# Patient Record
Sex: MALE | Race: OTHER | Employment: OTHER | URBAN - METROPOLITAN AREA
[De-identification: names, ages, dates, MRNs, and addresses within clinical notes are randomized per-mention and may not be internally consistent; named-entity substitution may affect disease eponyms.]

---

## 2017-11-27 PROBLEM — M46.26 OSTEOMYELITIS OF LUMBAR SPINE (HCC): Status: ACTIVE | Noted: 2017-11-27

## 2017-11-27 PROBLEM — M46.46 LUMBAR DISCITIS: Status: ACTIVE | Noted: 2017-11-27

## 2018-01-29 ENCOUNTER — OFFICE VISIT (OUTPATIENT)
Dept: FAMILY MEDICINE CLINIC | Age: 63
End: 2018-01-29

## 2018-01-29 VITALS
SYSTOLIC BLOOD PRESSURE: 157 MMHG | HEART RATE: 80 BPM | RESPIRATION RATE: 18 BRPM | OXYGEN SATURATION: 97 % | TEMPERATURE: 98.3 F | BODY MASS INDEX: 39.17 KG/M2 | DIASTOLIC BLOOD PRESSURE: 81 MMHG | WEIGHT: 315 LBS | HEIGHT: 75 IN

## 2018-01-29 DIAGNOSIS — I87.2 VENOUS INSUFFICIENCY OF BOTH LOWER EXTREMITIES: ICD-10-CM

## 2018-01-29 DIAGNOSIS — D68.9 ACQUIRED COAGULATION DISORDER (HCC): ICD-10-CM

## 2018-01-29 DIAGNOSIS — G89.4 CHRONIC PAIN SYNDROME: Primary | ICD-10-CM

## 2018-01-29 DIAGNOSIS — I48.0 PAROXYSMAL ATRIAL FIBRILLATION (HCC): ICD-10-CM

## 2018-01-29 DIAGNOSIS — E66.01 OBESITY, MORBID (HCC): ICD-10-CM

## 2018-01-29 RX ORDER — DIPHENHYDRAMINE HCL 25 MG
25 CAPSULE ORAL
COMMUNITY
End: 2018-12-12

## 2018-01-29 RX ORDER — FUROSEMIDE 20 MG/1
TABLET ORAL DAILY
COMMUNITY
End: 2018-12-12

## 2018-01-29 RX ORDER — OXYCODONE AND ACETAMINOPHEN 5; 325 MG/1; MG/1
1 TABLET ORAL
Qty: 84 TAB | Refills: 0 | Status: SHIPPED | OUTPATIENT
Start: 2018-01-29 | End: 2018-02-12

## 2018-01-29 RX ORDER — OXYCODONE AND ACETAMINOPHEN 5; 325 MG/1; MG/1
TABLET ORAL
Refills: 0 | COMMUNITY
Start: 2017-11-22 | End: 2018-01-29 | Stop reason: SDUPTHER

## 2018-01-29 RX ORDER — WARFARIN 10 MG/1
10 TABLET ORAL DAILY
COMMUNITY
End: 2018-01-31 | Stop reason: SDUPTHER

## 2018-01-29 RX ORDER — METHOCARBAMOL 500 MG/1
500 TABLET, FILM COATED ORAL 3 TIMES DAILY
Qty: 90 TAB | Refills: 2 | Status: SHIPPED | OUTPATIENT
Start: 2018-01-29 | End: 2018-12-12

## 2018-01-29 RX ORDER — WARFARIN 7.5 MG/1
7.5 TABLET ORAL DAILY
COMMUNITY
End: 2018-08-08 | Stop reason: DRUGHIGH

## 2018-01-29 RX ORDER — METHOCARBAMOL 500 MG/1
TABLET, FILM COATED ORAL 4 TIMES DAILY
COMMUNITY
End: 2018-01-29 | Stop reason: SDUPTHER

## 2018-01-29 RX ORDER — CARVEDILOL 3.12 MG/1
TABLET ORAL 2 TIMES DAILY WITH MEALS
COMMUNITY
End: 2018-12-12

## 2018-01-29 RX ORDER — OXYCODONE AND ACETAMINOPHEN 5; 325 MG/1; MG/1
1 TABLET ORAL
Qty: 84 TAB | Refills: 0 | Status: SHIPPED | OUTPATIENT
Start: 2018-02-12 | End: 2018-03-02 | Stop reason: SDUPTHER

## 2018-01-29 NOTE — PROGRESS NOTES
Chief Complaint   Patient presents with    New Patient       Pt preferred language for health care discussion is English. Is someone accompanying this pt? wife    Is the patient using any DME equipment during [address]? wheelchair    Depression Screening:  PHQ over the last two weeks 1/29/2018   Little interest or pleasure in doing things Not at all   Feeling down, depressed or hopeless Not at all   Total Score PHQ 2 0       Learning Assessment:  Learning Assessment 1/29/2018   PRIMARY LEARNER Patient   HIGHEST LEVEL OF EDUCATION - PRIMARY LEARNER  SOME COLLEGE   PRIMARY LANGUAGE ENGLISH   LEARNER PREFERENCE PRIMARY DEMONSTRATION   ANSWERED BY JULIA Rosenbaum   RELATIONSHIP SELF       Abuse Screening:  Abuse Screening Questionnaire 1/29/2018   Do you ever feel afraid of your partner? N   Are you in a relationship with someone who physically or mentally threatens you? N   Is it safe for you to go home? Y         Health Maintenance reviewed and discussed per provider. Yes, patients first visit any and all HM discussed with and ordered by the Provider    Pt currently taking Antiplatelet therapy? Coumadin      Coordination of Care:  1. Have you been to the ER, urgent care clinic since your last visit? Hospitalized since your last visit? [facility],[address] rehab    2. Have you seen or consulted any other health care providers outside of the [address] [place] [address] since your last visit? Include any pap smears or colon screening.  no

## 2018-01-29 NOTE — LETTER
Name:Casey Rosenbaum LMK:[birth year] MR #:<[id number]> Provider Magdalena Valenzuela NP  
*EDGS-037* BSMG-491 (5/16) Page 1 of 5 Initial [facility] CONTROLLED SUBSTANCE AGREEMENT I may be prescribed medications that are controlled substances as part  of my treatment plan for management of my medical condition(s). The goal of my treatment plan is to maintain and/or improve my health and wellbeing. Because controlled substances have an increased risk of abuse or harm, continual re-evaluation is needed determine if the goals of my treatment plan are being met for my safety and the safety of others. Darrius Davis  am entering into this Controlled Substance Agreement with my provider, Bibi Munoz NP at [address] [place] [address] . I understand that successful treatment requires mutual trust and honesty between me and my provider. I understand that there are state and federal laws and regulations which apply to the medications that my provider may prescribe that must be followed. I understand there are risks and benefits ts of taking the medicines that my provider may prescribe. I understand and agree that following this Agreement is necessary in continuing my provider-patient relationship and success of my treatment plan. As a part of my treatment plan, I agree to the following: COMMUNICATION: 
 
1. I will communicate fully with my provider about my medical condition(s), including the effect on my daily life and how well my medications are helping. I will tell my provider all of the medications that I take for any reason, including medications I receive from another health care provider, and will notify my provider about all issues, problems or concerns, including any side effects, which may be related to my medications. I understand that this information allows my provider to adjust my treatment plan to help manage my medical condition.  I understand that this information will become part of my permanent medical record. 2. I will notify my provider if I have a history of alcohol/drug misuse/addiction or if I have had treatment for alcohol/drug addiction in the past, or if I have a new problem with or concern about alcohol/drug use/addiction, because this increases the likelihood of high risk behaviors and may lead to serious medical conditions. 3. Females Only: I will notify my provider if I am or become pregnant, or if I intend to become pregnant, or if I intend to breastfeed. I understand that communication of these issues with my provider is important, due to possible effects my medication could have on an unborn fetus or breastfeeding child. Name:.Chandra Rosenbaum QKM:[birth year]77 MR #:<[id number]> Provider Ant Smith NP  
*[id number]* BSMG-491 (5/16) Page 2 of 5 Initial [employer] MISUSE OF MEDICATIONS / DRUGS: 
 
1. I agree to take all controlled substances as prescribed, and will not misuse or abuse any controlled substances prescribed by my provider. For my safety, I will not increase the amount of medicine I take without first talking with and getting permission from my provider. 2. If I have a medical emergency, another health care provider may prescribe me medication. If I seek emergency treatment, I will notify my provider within seventy-two (72) hours. 3. I understand that my provider may discuss my use and/or possible misuse/abuse of controlled substances and alcohol, as appropriate, with any health care provider involved in my care, pharmacist or legal authority. ILLEGAL DRUGS: 
 
1. I will not use illegal drugs of any kind, including but not limited to marijuana, heroin, cocaine, or any prescription drug which is not prescribed to me. DRUG DIVERSION / PRESCRIPTION FRAUD: 
 
1. I will not share, sell, trade, give away, or otherwise misuse my prescriptions or medications. 2. I will not alter any prescriptions provided to me by my provider. SINGLE PROVIDER: 
 
1. I agree that all controlled substances that I take will be prescribed only by my provider (or his/her covering provider) under this Agreement. This agreement does not prevent me from seeking emergency medical treatment or receiving pain management related to a surgery. PROTECTING MEDICATIONS: 
 
1. I am responsible for keeping my prescriptions and medications in a safe and secure place including safeguarding them from loss or theft. I understand that lost, stolen or damaged/destroyed prescriptions or medications will not be replaced. Name:.Chandra Rosenbaum VVD:0/56/4754 MR #:<[id number]> Provider Wallace Rodriguez, JULIA  
*[id number]* BSMG-491 (5/16) Page 3 of 5 Initial [facility] PRESCRIPTION RENEWALS/REFILLS: 
 
1. I will follow my controlled substance medication schedule as prescribed by my provider. 2. I understand and agree that I will make any requests for renewals or refills of my prescriptions only at the time of an office visit or during my providers regular office hours subject to the prescription refill requirements of the individual practice. 3. I understand that my provider may not call in prescriptions for controlled substances to my pharmacy. 4. I understand that my provider may adjust or discontinue these medications as deemed appropriate for my medical treatment plan. This Agreement does not guarantee the prescription of controlled medications. 5. I agree that if my medications are adjusted or discontinued, I will properly dispose of any remaining medications. I understand that I will be required to dispose of any remaining controlled medications prior to being provided with any prescriptions for other controlled medications.  
 
 
1. I authorize my provider and my pharmacy to cooperate fully with any local, state, or federal law enforcement agency in the investigation of any possible misuse, sale, or other diversion of my controlled substance prescriptions or medications. RISKS: 
 
 
1. I understand that if I do not adhere to this Agreement in any way, my provider may change my prescriptions, stop prescribing controlled substances or end our provider-patient relationship. 2. If my provider decides to stop prescribing medication, or decides to end our provider-patient relationship,my provider may require that I taper my medications slowly. If necessary, my provider may also provide a prescription for other medications to treat my withdrawal symptoms. UNDERSTANDING THIS AGREEMENT: 
 
I understand that my provider may adjust or stop my prescriptions for controlled substances based on my medical condition and my treatment plan. I understand that this Agreement does not guarantee that I will be prescribed medications or controlled substances. I understand that controlled substances may be just one part 
of my treatment plan. My initial on each page and my signature below shows that I have read each page of this Agreement, I have had an opportunity to ask questions, and all of my questions have been answered to my satisfaction by my provider. By signing below, I agree to comply with this Agreement, and I understand that if I do not follow the Agreements listed above, my provider may stop 
 
 
_________________________________________  Date/Time 1/29/2018 10:43 AM   
             (Patient Signature) 
 
 
________________________________________  Date/Time 1/29/2018 10:43 
            (Provider Signature)

## 2018-01-29 NOTE — MR AVS SNAPSHOT
[address] [place] [address] 
[phone] Patient: Asif Gallagher MRN: [id number] YTN:2/27/[birth year]351 Visit Information Date & Time Provider Department Dept. Phone Encounter #  
 1/29/2018  9:30 AM Sharlot Krabbe, NP [facility] [employer] [phone] [id number] Follow-up Instructions Return in about 4 weeks (around 2/26/2018). Upcoming Health Maintenance Date Due Hepatitis C Screening 1955 DTaP/Tdap/Td series (1 - Tdap) 2/28/1977 FOBT Q 1 YEAR AGE 50-75 2/28/2006 ZOSTER VACCINE AGE 60> 12/29/2015 Influenza Age 5 to Adult 8/1/2017 Allergies as of 1/29/2018  Never Reviewed Severity Noted Reaction Type Reactions Ciprofloxacin High 01/29/2018    Anaphylaxis Morphine  01/29/2018    Itching Does not alleviate pain Onion  01/29/2018    Other (comments) Elevated HTN and Body temp and cramping Current Immunizations  Never Reviewed No immunizations on file. Not reviewed this visit You Were Diagnosed With   
  
 Codes Comments Chronic pain syndrome    -  Primary ICD-10-CM: G89.4 ICD-9-CM: 338.4 Obesity, morbid ([facility]     ICD-10-CM: E66.01 
ICD-9-CM: 278.01 Paroxysmal atrial fibrillation (HCC)     ICD-10-CM: I48.0 ICD-9-CM: 427.31 Venous insufficiency of both lower extremities     ICD-10-CM: I87.2 ICD-9-CM: 459.81 Acquired coagulation disorder ([facility]     ICD-10-CM: T80.2 ICD-9-CM: 286. 9 Vitals BP Pulse Temp Resp Height(growth percentile) Weight(growth percentile) 157/81 80 98.3 °F (36.8 °C) (Oral) 18 6' 3\" (1.905 m) (!) 359 lb 9.6 oz (163.1 kg) SpO2 BMI Smoking Status 97% 44.95 kg/m2 Current Every Day Smoker BMI and BSA Data Body Mass Index Body Surface Area 44.95 kg/m 2 2.94 m 2 Preferred Pharmacy Pharmacy Name Phone [facility], [place] [address] [phone] Your Updated Medication List  
  
   
This list is accurate as of: 1/29/18 10:43 AM.  Always use your most recent med list.  
  
  
  
  
 BENADRYL 25 mg capsule Generic drug:  diphenhydrAMINE Take 25 mg by mouth every six (6) hours as needed. carvedilol 3.125 mg tablet Commonly known as:  Lakeisha Hard Take  by mouth two (2) times daily (with meals). furosemide 20 mg tablet Commonly known as:  LASIX Take  by mouth daily. methocarbamol 500 mg tablet Commonly known as:  ROBAXIN Take 1 Tab by mouth three (3) times daily. * oxyCODONE-acetaminophen 5-325 mg per tablet Commonly known as:  PERCOCET Take 1 Tab by mouth every four (4) hours as needed for Pain for up to 14 days. Max Daily Amount: 6 Tabs. * oxyCODONE-acetaminophen 5-325 mg per tablet Commonly known as:  PERCOCET Take 1 Tab by mouth every four (4) hours as needed for Pain. Max Daily Amount: 6 Tabs. Start taking on:  2/12/2018 * warfarin 10 mg tablet Commonly known as:  COUMADIN Take 10 mg by mouth daily. Take 1 tablet by mouth on Tues and Thrurs   Indications: DEEP VEIN THROMBOSIS PREVENTION  
  
 * warfarin 7.5 mg tablet Commonly known as:  COUMADIN Take 7.5 mg by mouth daily. Take one tablet by mouth Mon, Wed, Fri, Sat and Sun   Indications: DEEP VEIN THROMBOSIS PREVENTION  
  
 * Notice: This list has 4 medication(s) that are the same as other medications prescribed for you. Read the directions carefully, and ask your doctor or other care provider to review them with you. Prescriptions Printed Refills  
 oxyCODONE-acetaminophen (PERCOCET) 5-325 mg per tablet 0 Sig: Take 1 Tab by mouth every four (4) hours as needed for Pain for up to 14 days. Max Daily Amount: 6 Tabs. Class: Print  Route: Oral  
 oxyCODONE-acetaminophen (PERCOCET) 5-325 mg per tablet 0  
 Starting on: 2/12/2018 Sig: Take 1 Tab by mouth every four (4) hours as needed for Pain. Max Daily Amount: 6 Tabs. Class: Print Route: Oral  
  
Prescriptions Sent to Pharmacy Refills  
 methocarbamol (ROBAXIN) 500 mg tablet 2 Sig: Take 1 Tab by mouth three (3) times daily. Class: Normal  
 Pharmacy: [facility], [place] [address] Ph #: [phone] Route: Oral  
  
Follow-up Instructions Return in about 4 weeks (around 2/26/2018). To-Do List   
 01/29/2018 Lab:  PROTHROMBIN TIME + INR Patient Instructions Come in for coumadin check, in 2 weeks. Get records from previous providers. Start taking coreg regularly. Introducing [facility] SERVICES! [employer] introduces IMASTE patient portal. Now you can access parts of your medical record, email your doctor's office, and request medication refills online. 1. In your internet browser, go to [email or web address] 2. Click on the First Time User? Click Here link in the Sign In box. You will see the New Member Sign Up page. 3. Enter your IMASTE Access Code exactly as it appears below. You will not need to use this code after youve completed the sign-up process. If you do not sign up before the expiration date, you must request a new code. · IMASTE Access Code: [email or web address] Expires: 4/29/2018  9:10 AM 
 
4. Enter the last four digits of your Social Security Number (xxxx) and Date of Birth (mm/dd/yyyy) as indicated and click Submit. You will be taken to the next sign-up page. 5. Create a IMASTE ID. This will be your IMASTE login ID and cannot be changed, so think of one that is secure and easy to remember. 6. Create a IMASTE password. You can change your password at any time. 7. Enter your Password Reset Question and Answer. This can be used at a later time if you forget your password. 8. Enter your e-mail address. You will receive e-mail notification when new information is available in [address] 9. Click Sign Up. You can now view and download portions of your medical record. 10. Click the Download Summary menu link to download a portable copy of your medical information. If you have questions, please visit the Frequently Asked Questions section of the [employer] website. Remember, TasteBook is NOT to be used for urgent needs. For medical emergencies, dial 911. Now available from your iPhone and Android! Please provide this summary of care documentation to your next provider. Your primary care clinician is listed as Lucille Ferguson. If you have any questions after today's visit, please call [phone].

## 2018-01-29 NOTE — PATIENT INSTRUCTIONS
Come in for coumadin check, in 2 weeks. Get records from previous providers. Start taking coreg regularly.

## 2018-01-31 DIAGNOSIS — Z86.718 HISTORY OF DVT IN ADULTHOOD: Primary | ICD-10-CM

## 2018-01-31 RX ORDER — WARFARIN 10 MG/1
10 TABLET ORAL DAILY
Qty: 30 TAB | Refills: 5 | Status: SHIPPED | OUTPATIENT
Start: 2018-01-31 | End: 2018-07-23 | Stop reason: SDUPTHER

## 2018-01-31 NOTE — PROGRESS NOTES
Chief Complaint   Patient presents with    New Patient     he is a 65y.o. year old male who presents to [facility], pt with multiple chronic health problems, was hospitalized last month for Osteomyelitis of lumbar spine and discharge to rehab which he went home from rehab 2 days prior to todays visit. He has had follow up with ID and continues with care through them, he states he has finished his antibiotics but is under significant pain since being home. He is taking percocet 5/325 6x/day currently. Discussed plan with decreasing timing and pain contract will continue with current plan, but if over next 2 months not able to significantly decrease reliance will refer to pain management. Pt is on chronic coumadin for clotting disorder- previous LE DVT and multiple PEs he cannot name disorder and have no previous records currently. He states he has INR goal of 2.6-3.2 and \"I can tell when it is off, my previous provider just let me come in and check when I felt like it was off\". He has had several venous surgeries with chronic stasis and LE discoloration/swelling, but states he does not follow with vascular. He states he has had an arrhythmia - does not know type, [facility] records indicate PAF, pt on coreg and lasix, but no record of CHF, pt states coreg is to regulate rate. Reviewed and agree with Nurse Note duplicated in this note. Reviewed PmHx, RxHx, FmHx, SocHx, AllgHx and updated in chart.     Patient Labs were reviewed: yes    Patient Past Records were reviewed:  yes    Review of Systems - negative except as listed above    Objective:     Vitals:    01/29/18 0941   BP: 157/81   Pulse: 80   Resp: 18   Temp: 98.3 °F (36.8 °C)   TempSrc: Oral   SpO2: 97%   Weight: (!) 359 lb 9.6 oz (163.1 kg)   Height: 6' 3\" (1.905 m)     Physical Examination: General appearance - alert, well appearing, and in no distress  Mental status - alert, oriented to person, place, and time  Eyes - pupils equal and reactive, extraocular eye movements intact  Mouth - mucous membranes moist, pharynx normal without lesions  Neck - supple, no significant adenopathy  Chest - clear to auscultation, no wheezes, rales or rhonchi, symmetric air entry  Heart - normal rate, regular rhythm, normal S1, S2, no murmurs, rubs, clicks or gallops  Abdomen - soft, nontender, nondistended, no masses or organomegaly  Extremities - peripheral pulses normal, no pedal edema, no clubbing or cyanosis    Assessment/ Plan:   Diagnoses and all orders for this visit:    1. Chronic pain syndrome  -     oxyCODONE-acetaminophen (PERCOCET) 5-325 mg per tablet; Take 1 Tab by mouth every four (4) hours as needed for Pain for up to 14 days. Max Daily Amount: 6 Tabs. -     oxyCODONE-acetaminophen (PERCOCET) 5-325 mg per tablet; Take 1 Tab by mouth every four (4) hours as needed for Pain. Max Daily Amount: 6 Tabs. -     methocarbamol (ROBAXIN) 500 mg tablet; Take 1 Tab by mouth three (3) times daily. 2. Obesity, morbid ([address]    3. Paroxysmal atrial fibrillation ([address]    4. Venous insufficiency of both lower extremities    5. Acquired coagulation disorder (HCC)  -     PROTHROMBIN TIME + INR; Future       Encounter Diagnoses   Name Primary?     Chronic pain syndrome Yes    Obesity, morbid (HCC)     Paroxysmal atrial fibrillation (HCC)     Venous insufficiency of both lower extremities     Acquired coagulation disorder (HCC)      Orders Placed This Encounter    PROTHROMBIN TIME + INR    carvedilol (COREG) 3.125 mg tablet    furosemide (LASIX) 20 mg tablet    diphenhydrAMINE (BENADRYL) 25 mg capsule    DISCONTD: methocarbamol (ROBAXIN) 500 mg tablet    DISCONTD: oxyCODONE-acetaminophen (PERCOCET) 5-325 mg per tablet    warfarin (COUMADIN) 10 mg tablet    warfarin (COUMADIN) 7.5 mg tablet    oxyCODONE-acetaminophen (PERCOCET) 5-325 mg per tablet    oxyCODONE-acetaminophen (PERCOCET) 5-325 mg per tablet    methocarbamol (ROBAXIN) 500 mg tablet     Follow-up Disposition:  Return in about 4 weeks (around 2/26/2018). Patient Instructions   Come in for coumadin check, in 2 weeks. Get records from previous providers. Start taking coreg regularly. I have discussed the diagnosis with the patient and the intended plan as seen in the above orders. The patient has received an After-Visit Summary and questions were answered concerning future plans.      Medication Side Effects and Warnings were discussed with patient: yes      Jerad Plascencia, NP-C  [employer]

## 2018-01-31 NOTE — TELEPHONE ENCOUNTER
Pt states got rx filled for only 2 tabs. States took one tab yesterday and will take another today and will be out by tomorrow. Pt confirmed pharmacy on file. Requested Prescriptions     Pending Prescriptions Disp Refills    warfarin (COUMADIN) 10 mg tablet       Sig: Take 1 Tab by mouth daily.  Take 1 tablet by mouth on Tues and Thrurs   Indications: [address]

## 2018-02-15 DIAGNOSIS — G89.4 CHRONIC PAIN SYNDROME: ICD-10-CM

## 2018-02-15 RX ORDER — OXYCODONE AND ACETAMINOPHEN 5; 325 MG/1; MG/1
1 TABLET ORAL
Qty: 84 TAB | Refills: 0 | OUTPATIENT
Start: 2018-02-15

## 2018-02-15 NOTE — TELEPHONE ENCOUNTER
Requested Prescriptions     Pending Prescriptions Disp Refills    oxyCODONE-acetaminophen (PERCOCET) 5-325 mg per tablet 84 Tab 0     Sig: Take 1 Tab by mouth every four (4) hours as needed for Pain. Max Daily Amount: 6 Tabs. Pt is down to last two tabs and is currently taking 6 a day. Pt takes 1 tab every 4 hours for pain. Pt would like to have request filled as quickly as possible and states his gratitude for attention to matter.

## 2018-02-15 NOTE — TELEPHONE ENCOUNTER
Spoke with patient, confirmed name and . Reminded patient that 2 prescriptions were written for him at his appointment last month. The patient stated that he would contact his pharmacy to figure out what is going on. Understanding verbalized.

## 2018-02-28 DIAGNOSIS — G89.4 CHRONIC PAIN SYNDROME: ICD-10-CM

## 2018-02-28 RX ORDER — OXYCODONE AND ACETAMINOPHEN 5; 325 MG/1; MG/1
1 TABLET ORAL
Qty: 84 TAB | Refills: 0 | OUTPATIENT
Start: 2018-02-28

## 2018-02-28 NOTE — TELEPHONE ENCOUNTER
Pt aware provider out of office until 3/2/18. Requested Prescriptions     Pending Prescriptions Disp Refills    oxyCODONE-acetaminophen (PERCOCET) 5-325 mg per tablet 84 Tab 0     Sig: Take 1 Tab by mouth every four (4) hours as needed for Pain. Max Daily Amount: 6 Tabs.

## 2018-03-02 ENCOUNTER — OFFICE VISIT (OUTPATIENT)
Dept: FAMILY MEDICINE CLINIC | Age: 63
End: 2018-03-02

## 2018-03-02 VITALS
WEIGHT: 315 LBS | DIASTOLIC BLOOD PRESSURE: 73 MMHG | RESPIRATION RATE: 20 BRPM | BODY MASS INDEX: 39.17 KG/M2 | SYSTOLIC BLOOD PRESSURE: 117 MMHG | OXYGEN SATURATION: 96 % | HEIGHT: 75 IN | HEART RATE: 95 BPM | TEMPERATURE: 97.8 F

## 2018-03-02 DIAGNOSIS — M46.26 OSTEOMYELITIS OF LUMBAR SPINE (HCC): ICD-10-CM

## 2018-03-02 DIAGNOSIS — G89.4 CHRONIC PAIN SYNDROME: ICD-10-CM

## 2018-03-02 DIAGNOSIS — M46.46 LUMBAR DISCITIS: Primary | ICD-10-CM

## 2018-03-02 RX ORDER — OXYCODONE AND ACETAMINOPHEN 5; 325 MG/1; MG/1
1 TABLET ORAL
Qty: 56 TAB | Refills: 0 | Status: SHIPPED | OUTPATIENT
Start: 2018-03-16 | End: 2018-03-26 | Stop reason: SDUPTHER

## 2018-03-02 RX ORDER — OXYCODONE AND ACETAMINOPHEN 5; 325 MG/1; MG/1
1 TABLET ORAL
Qty: 56 TAB | Refills: 0 | Status: SHIPPED | OUTPATIENT
Start: 2018-03-02 | End: 2018-03-26 | Stop reason: SDUPTHER

## 2018-03-02 NOTE — PROGRESS NOTES
Chief Complaint   Patient presents with    Back Pain    Hip Pain     he is a 59y.o. year old male who presents for evaluation of chronic pain. Pt with increased pain past 2 days but agreeable to decrease dosage, will continue rx with 4x/day dosage. I have reviewed this patient's report generated by the [place] which does not demonstrate aberrancies and inconsistencies with regard to the historical prescribing of controlled medications to this patient by other providers. Reviewed and agree with Nurse Note duplicated in this note. Reviewed PmHx, RxHx, FmHx, SocHx, AllgHx and updated in chart. Patient Labs were reviewed: yes    Patient Past Records were reviewed:  yes    Review of Systems - negative except as listed above    Objective:     Vitals:    03/02/18 1311   BP: 117/73   Pulse: 95   Resp: 20   Temp: 97.8 °F (36.6 °C)   TempSrc: Oral   SpO2: 96%   Weight: (!) 359 lb (162.8 kg)   Height: 6' 3\" (1.905 m)     Physical Examination: General appearance - alert, well appearing, and in no distress  Mental status - alert, oriented to person, place, and time  Eyes - pupils equal and reactive, extraocular eye movements intact  Mouth - mucous membranes moist, pharynx normal without lesions  Heart - normal rate, regular rhythm, normal S1, S2, no murmurs, rubs, clicks or gallops  Abdomen - soft, nontender, nondistended, no masses or organomegaly  Extremities - peripheral pulses normal, no pedal edema, no clubbing or cyanosis    Assessment/ Plan:   Diagnoses and all orders for this visit:    1. Lumbar discitis    2. Osteomyelitis of lumbar spine ([address]    3. Chronic pain syndrome  -     oxyCODONE-acetaminophen (PERCOCET) 5-325 mg per tablet; Take 1 Tab by mouth every six (6) hours as needed for Pain for up to 14 days. Max Daily Amount: 4 Tabs. -     oxyCODONE-acetaminophen (PERCOCET) 5-325 mg per tablet; Take 1 Tab by mouth every six (6) hours as needed for Pain.  Max Daily Amount: 4 Tabs. Encounter Diagnoses   Name Primary?  Lumbar discitis Yes    Osteomyelitis of lumbar spine (HCC)     Chronic pain syndrome      Orders Placed This Encounter    oxyCODONE-acetaminophen (PERCOCET) 5-325 mg per tablet    oxyCODONE-acetaminophen (PERCOCET) 5-325 mg per tablet     Follow-up Disposition:  Return in about 4 weeks (around 3/30/2018). There are no Patient Instructions on file for this visit. I have discussed the diagnosis with the patient and the intended plan as seen in the above orders. The patient has received an After-Visit Summary and questions were answered concerning future plans.      Medication Side Effects and Warnings were discussed with patient: yes      Anais Hayes NP-C  [facility]

## 2018-03-02 NOTE — TELEPHONE ENCOUNTER
Pt called to check status of refill. I advised him that he is overdue for a FU. Pt stated he was not aware & scheduled FU for today.

## 2018-03-02 NOTE — PROGRESS NOTES
Chief Complaint   Patient presents with    Back Pain    Hip Pain     Sharping pain down the lower hip area. 1. Have you been to the ER, urgent care clinic since your last visit? Hospitalized since your last visit? No    2. Have you seen or consulted any other health care providers outside of the [address] [place] [address] since your last visit? Include any pap smears or colon screening.  No

## 2018-03-26 ENCOUNTER — HOSPITAL ENCOUNTER (OUTPATIENT)
Dept: LAB | Age: 63
Discharge: HOME | End: 2018-03-26
Payer: MEDICARE

## 2018-03-26 ENCOUNTER — OFFICE VISIT (OUTPATIENT)
Dept: FAMILY MEDICINE CLINIC | Age: 63
End: 2018-03-26

## 2018-03-26 VITALS
RESPIRATION RATE: 20 BRPM | OXYGEN SATURATION: 96 % | HEART RATE: 102 BPM | HEIGHT: 75 IN | BODY MASS INDEX: 39.17 KG/M2 | TEMPERATURE: 98.4 F | WEIGHT: 315 LBS | SYSTOLIC BLOOD PRESSURE: 148 MMHG | DIASTOLIC BLOOD PRESSURE: 90 MMHG

## 2018-03-26 DIAGNOSIS — I82.5Y3 CHRONIC DEEP VEIN THROMBOSIS (DVT) OF PROXIMAL VEIN OF BOTH LOWER EXTREMITIES (HCC): ICD-10-CM

## 2018-03-26 DIAGNOSIS — I82.5Y3 CHRONIC DEEP VEIN THROMBOSIS (DVT) OF PROXIMAL VEIN OF BOTH LOWER EXTREMITIES (HCC): Primary | ICD-10-CM

## 2018-03-26 DIAGNOSIS — G89.4 CHRONIC PAIN SYNDROME: ICD-10-CM

## 2018-03-26 LAB
INR PPP: 3 (ref 0.8–1.2)
PROTHROMBIN TIME: 30.6 SEC (ref 11.5–15.2)

## 2018-03-26 PROCEDURE — 85610 PROTHROMBIN TIME: CPT | Performed by: NURSE PRACTITIONER

## 2018-03-26 RX ORDER — OXYCODONE AND ACETAMINOPHEN 5; 325 MG/1; MG/1
1 TABLET ORAL
Qty: 56 TAB | Refills: 0 | Status: SHIPPED | OUTPATIENT
Start: 2018-03-26 | End: 2018-04-09

## 2018-03-26 RX ORDER — OXYCODONE AND ACETAMINOPHEN 5; 325 MG/1; MG/1
1 TABLET ORAL
Qty: 56 TAB | Refills: 0 | Status: SHIPPED | OUTPATIENT
Start: 2018-04-09 | End: 2018-05-08 | Stop reason: ALTCHOICE

## 2018-03-26 NOTE — PROGRESS NOTES
Chief Complaint   Patient presents with    Medication Refill       Pt preferred language for health care discussion is English. Is someone accompanying this pt? no    Is the patient using any DME equipment during [address]? cane    Depression Screening:  PHQ over the last two weeks 3/26/2018 3/2/2018 2018   Little interest or pleasure in doing things Not at all Not at all Not at all   Feeling down, depressed or hopeless Not at all Not at all Not at all   Total Score PHQ 2 0 0 0       Learning Assessment:  Learning Assessment 2018   PRIMARY LEARNER Patient   HIGHEST LEVEL OF EDUCATION - PRIMARY LEARNER  SOME COLLEGE   PRIMARY LANGUAGE ENGLISH   LEARNER PREFERENCE PRIMARY DEMONSTRATION   ANSWERED BY Tariq Rosenbaum   RELATIONSHIP SELF       Abuse Screening:  Abuse Screening Questionnaire 2018   Do you ever feel afraid of your partner? N   Are you in a relationship with someone who physically or mentally threatens you? N   Is it safe for you to go home? Y         Health Maintenance reviewed and discussed per provider. Yes    Pt is due for FIT test or Colonoscopy, Mammogram, Bone Density, Pap, Hep C screen, Microalbumin, A1C, Lipid, Foot exam, Diabetic eye exam, Zostavax, Pneumo-13 or Peumo-23, Flu, Tdap. Please order/place referral if appropriate. Pt currently taking Antiplatelet therapy? Coumadin      Coordination of Care:  1. Have you been to the ER, urgent care clinic since your last visit? Hospitalized since your last visit? no    2. Have you seen or consulted any other health care providers outside of the [address] [place] [address] since your last visit? Include any pap smears or colon screening. no      Verified name and  with parent. Order verified in [facility] per [employer]. Verified 6 rights. Informed patient of procedure, and rational, denies any questions. Successful cannulation of patient's vein (L antacubital) via # 23 Gauge  butterfly using aseptic technique no swelling noted.  2x2 and band-aid applied. Specimen sent to lab for further testing Patient tolerated the procedure without incident.  Released from clinic

## 2018-03-26 NOTE — MR AVS SNAPSHOT
[address] [address] 
[phone] Patient: Lilo Preston MRN: [id number] A:[birth year] Visit Information Date & Time Provider Department Dept. Phone Encounter #  
 3/26/2018  3:30 PM Angélica Spangler NP [address] [phone] [id number] Follow-up Instructions Return in about 4 weeks (around 4/23/2018). Upcoming Health Maintenance Date Due Hepatitis C Screening 1955 Pneumococcal 19-64 Medium Risk (1 of 1 - PPSV23) 2/28/1975 DTaP/Tdap/Td series (1 - Tdap) 2/28/1977 FOBT Q 1 YEAR AGE 50-75 2/28/2006 ZOSTER VACCINE AGE 60> 12/29/2015 Influenza Age 5 to Adult 8/1/2017 MEDICARE YEARLY EXAM 3/14/2018 Allergies as of 3/26/2018  Review Complete On: 3/26/2018 By: Angélica Spangler NP Severity Noted Reaction Type Reactions Ciprofloxacin High 11/27/2017    Anaphylaxis, Diarrhea Morphine  01/29/2018    Itching Does not alleviate pain Onion  01/29/2018    Other (comments) Elevated HTN and Body temp and cramping Current Immunizations  Never Reviewed No immunizations on file. Not reviewed this visit You Were Diagnosed With   
  
 Codes Comments Chronic deep vein thrombosis (DVT) of proximal vein of both lower extremities (HCC)    -  Primary ICD-10-CM: V62.3Y4 ICD-9-CM: 453.51 Chronic pain syndrome     ICD-10-CM: G89.4 ICD-9-CM: 338. 4 Vitals BP Pulse Temp Resp Height(growth percentile) Weight(growth percentile) 148/90 (!) 102 98.4 °F (36.9 °C) (Oral) 20 6' 3\" (1.905 m) 350 lb (158.8 kg) SpO2 BMI Smoking Status 96% 43.75 kg/m2 Current Every Day Smoker Vitals History BMI and BSA Data Body Mass Index Body Surface Area 43.75 kg/m 2 2.9 m 2 Preferred Pharmacy Pharmacy Name Phone  [facility], [address] [place] [address] [phone] Your Updated Medication List  
  
   
This list is accurate as of 3/26/18  4:08 PM.  Always use your most recent med list.  
  
  
  
  
 BENADRYL 25 mg capsule Generic drug:  diphenhydrAMINE Take 25 mg by mouth every six (6) hours as needed. carvedilol 3.125 mg tablet Commonly known as:  Lurlene Solo Take  by mouth two (2) times daily (with meals). furosemide 20 mg tablet Commonly known as:  LASIX Take  by mouth daily. methocarbamol 500 mg tablet Commonly known as:  ROBAXIN Take 1 Tab by mouth three (3) times daily. * oxyCODONE-acetaminophen 5-325 mg per tablet Commonly known as:  PERCOCET Take 1 Tab by mouth every six (6) hours as needed for Pain for up to 14 days. Max Daily Amount: 4 Tabs. * oxyCODONE-acetaminophen 5-325 mg per tablet Commonly known as:  PERCOCET Take 1 Tab by mouth every six (6) hours as needed for Pain. Max Daily Amount: 4 Tabs. Start taking on:  4/9/2018 * warfarin 7.5 mg tablet Commonly known as:  COUMADIN Take 7.5 mg by mouth daily. Take one tablet by mouth Mon, Wed, Fri, Sat and Sun   Indications: DEEP VEIN THROMBOSIS PREVENTION  
  
 * warfarin 10 mg tablet Commonly known as:  COUMADIN Take 1 Tab by mouth daily. Take 1 tablet by mouth on Tues and Thrurs   Indications: DEEP VEIN THROMBOSIS PREVENTION  
  
 * Notice: This list has 4 medication(s) that are the same as other medications prescribed for you. Read the directions carefully, and ask your doctor or other care provider to review them with you. Prescriptions Printed Refills  
 oxyCODONE-acetaminophen (PERCOCET) 5-325 mg per tablet 0 Starting on: 4/9/2018 Sig: Take 1 Tab by mouth every six (6) hours as needed for Pain. Max Daily Amount: 4 Tabs. Class: Print Route: Oral  
 oxyCODONE-acetaminophen (PERCOCET) 5-325 mg per tablet 0  Sig: Take 1 Tab by mouth every six (6) hours as needed for Pain for up to 14 days. Max Daily Amount: 4 Tabs. Class: Print Route: Oral  
  
We Performed the Following AMB POC PT/INR [23372 CPT(R)] Follow-up Instructions Return in about 4 weeks (around 4/23/2018). Patient Instructions Continue with pain medication, the last week ( or before ) try taking pain medication 3x/day instead of 4 and at follow up let me know how that is working for you. Prothrombin Time: About This Test 
What is it? Prothrombin time (PT) is a blood test that measures how long it takes for blood to clot. Prothrombin is one of several clotting factors your body makes to help your blood form clots when a blood vessel is damaged. A PT test may also be called an INR test (for international normalized ratio). Why is this test done? The test can be used to check for bleeding problems. It is also used to check how well warfarin, a medicine to prevent blood clots, is working. How can you prepare for the test? 
· In general, you don't need to prepare before having this test. Your doctor may give you some specific instructions. What happens during the test? 
· A health professional takes a sample of your blood. What else should you know about the test? 
· Your results will include an explanation of what a \"normal\" result is. This is called a \"reference range. \" It is just a guide. Your doctor will evaluate your results based on your health and other factors. This means that a value that falls outside the normal values listed may still be normal for you. · If you're taking warfarin, regular testing helps your doctor make sure you're taking the right amount. How long does the test take? · The test will take a few minutes. What happens after the test? 
· You will probably be able to go home right away. · You can go back to your usual activities right away. Follow-up care is a key part of your treatment and safety.  Be sure to make and go to all appointments, and call your doctor if you are having problems. It's also a good idea to keep a list of the medicines you take. Ask your doctor when you can expect to have your test results. Where can you learn more? Go to http://narciso-khoi.info/. Enter V788 in the search box to learn more about \"Prothrombin Time: About This Test.\" Current as of: September 21, 2016 Content Version: 11.4 © 8661-5974 [employer]. Care instructions adapted under license by [employer] (which disclaims liability or warranty for this information). If you have questions about a medical condition or this instruction, always ask your healthcare professional. [address] any warranty or liability for your use of this information. Introducing [facility]! Rose Cobb introduces [employer] patient portal. Now you can access parts of your medical record, email your doctor's office, and request medication refills online. 1. In your internet browser, go to [email or web address] 2. Click on the First Time User? Click Here link in the Sign In box. You will see the New Member Sign Up page. 3. Enter your [employer] Access Code exactly as it appears below. You will not need to use this code after youve completed the sign-up process. If you do not sign up before the expiration date, you must request a new code. · [employer] Access Code: [email or web address] Expires: 4/29/2018 10:10 AM 
 
4. Enter the last four digits of your Social Security Number (xxxx) and Date of Birth (mm/dd/yyyy) as indicated and click Submit. You will be taken to the next sign-up page. 5. Create a [employer] ID. This will be your [employer] login ID and cannot be changed, so think of one that is secure and easy to remember. 6. Create a [employer] password. You can change your password at any time. 7. Enter your Password Reset Question and Answer.  This can be used at a later time if you forget your password. 8. Enter your e-mail address. You will receive e-mail notification when new information is available in [address] 9. Click Sign Up. You can now view and download portions of your medical record. 10. Click the Download Summary menu link to download a portable copy of your medical information. If you have questions, please visit the Frequently Asked Questions section of the "[employer] website. Remember, "[employer] is NOT to be used for urgent needs. For medical emergencies, dial 911. Now available from your iPhone and Android! Please provide this summary of care documentation to your next provider. Your primary care clinician is listed as Meghan Kirkland. If you have any questions after today's visit, please call [phone].

## 2018-03-26 NOTE — PATIENT INSTRUCTIONS
You need to schedule your infectious disease follow up / [place] ID Dr. Black Pate or Fabian Shirley NP. Call [phone] to schedule     Continue with pain medication, the last week ( or before ) try taking pain medication 3x/day instead of 4 and at follow up let me know how that is working for you. Prothrombin Time: About This Test  What is it? Prothrombin time (PT) is a blood test that measures how long it takes for blood to clot. Prothrombin is one of several clotting factors your body makes to help your blood form clots when a blood vessel is damaged. A PT test may also be called an INR test (for international normalized ratio). Why is this test done? The test can be used to check for bleeding problems. It is also used to check how well warfarin, a medicine to prevent blood clots, is working. How can you prepare for the test?  · In general, you don't need to prepare before having this test. Your doctor may give you some specific instructions. What happens during the test?  · A health professional takes a sample of your blood. What else should you know about the test?  · Your results will include an explanation of what a \"normal\" result is. This is called a \"reference range. \" It is just a guide. Your doctor will evaluate your results based on your health and other factors. This means that a value that falls outside the normal values listed may still be normal for you. · If you're taking warfarin, regular testing helps your doctor make sure you're taking the right amount. How long does the test take? · The test will take a few minutes. What happens after the test?  · You will probably be able to go home right away. · You can go back to your usual activities right away. Follow-up care is a key part of your treatment and safety. Be sure to make and go to all appointments, and call your doctor if you are having problems. It's also a good idea to keep a list of the medicines you take.  Ask your doctor when you can expect to have your test results. Where can you learn more? Go to http://narciso-khoi.info/. Enter G465 in the search box to learn more about \"Prothrombin Time: About This Test.\"  Current as of: September 21, 2016  Content Version: 11.4  © 2477-5669 [employer], [employer]. Care instructions adapted under license by [employer] (which disclaims liability or warranty for this information). If you have questions about a medical condition or this instruction, always ask your healthcare professional. [address] any warranty or liability for your use of this information.

## 2018-03-27 ENCOUNTER — TELEPHONE (OUTPATIENT)
Dept: FAMILY MEDICINE CLINIC | Age: 63
End: 2018-03-27

## 2018-03-27 NOTE — PROGRESS NOTES
Chief Complaint   Patient presents with    Medication Refill     he is a 59y.o. 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 Did ok with reduced dosage last month, will try to reduce again this month. Reviewed and agree with Nurse Note duplicated in this note. Reviewed PmHx, RxHx, FmHx, SocHx, AllgHx and updated in chart. Patient Labs were reviewed: yes    Patient Past Records were reviewed:  yes    Review of Systems - negative except as listed above    Objective:     Vitals:    03/26/18 1537   BP: 148/90   Pulse: (!) 102   Resp: 20   Temp: 98.4 °F (36.9 °C)   TempSrc: Oral   SpO2: 96%   Weight: 350 lb (158.8 kg)   Height: 6' 3\" (1.905 m)     Physical Examination: General appearance - alert, well appearing, and in no distress  Mental status - alert, oriented to person, place, and time  Eyes - pupils equal and reactive, extraocular eye movements intact  Mouth - mucous membranes moist, pharynx normal without lesions  Chest - clear to auscultation, no wheezes, rales or rhonchi, symmetric air entry  Heart - normal rate, regular rhythm, normal S1, S2, no murmurs, rubs, clicks or gallops  Extremities - peripheral pulses normal, no pedal edema, no clubbing or cyanosis    Assessment/ Plan:   Diagnoses and all orders for this visit:    1. Chronic deep vein thrombosis (DVT) of proximal vein of both lower extremities (HCC)  -     PROTHROMBIN TIME + INR; Future  -     COLLECTION VENOUS BLOOD,VENIPUNCTURE    2. Chronic pain syndrome  -     oxyCODONE-acetaminophen (PERCOCET) 5-325 mg per tablet; Take 1 Tab by mouth every six (6) hours as needed for Pain. Max Daily Amount: 4 Tabs. -     oxyCODONE-acetaminophen (PERCOCET) 5-325 mg per tablet;  Take 1 Tab by mouth every six (6) hours as needed for Pain for up to 14 days. Max Daily Amount: 4 Tabs. Encounter Diagnoses   Name Primary?  Chronic deep vein thrombosis (DVT) of proximal vein of both lower extremities (HCC) Yes    Chronic pain syndrome      Orders Placed This Encounter    PROTHROMBIN TIME + INR    oxyCODONE-acetaminophen (PERCOCET) 5-325 mg per tablet    oxyCODONE-acetaminophen (PERCOCET) 5-325 mg per tablet     Follow-up Disposition:  Return in about 4 weeks (around 4/23/2018). Patient Instructions   You need to schedule your infectious disease follow up / [place] [facility] Dr. Lucretia Collier or Anand Conner NP. Call [phone] to schedule     Continue with pain medication, the last week ( or before ) try taking pain medication 3x/day instead of 4 and at follow up let me know how that is working for you. Prothrombin Time: About This Test  What is it? Prothrombin time (PT) is a blood test that measures how long it takes for blood to clot. Prothrombin is one of several clotting factors your body makes to help your blood form clots when a blood vessel is damaged. A PT test may also be called an INR test (for international normalized ratio). Why is this test done? The test can be used to check for bleeding problems. It is also used to check how well warfarin, a medicine to prevent blood clots, is working. How can you prepare for the test?  · In general, you don't need to prepare before having this test. Your doctor may give you some specific instructions. What happens during the test?  · A health professional takes a sample of your blood. What else should you know about the test?  · Your results will include an explanation of what a \"normal\" result is. This is called a \"reference range. \" It is just a guide. Your doctor will evaluate your results based on your health and other factors. This means that a value that falls outside the normal values listed may still be normal for you.   · If you're taking warfarin, regular testing helps your doctor make sure you're taking the right amount. How long does the test take? · The test will take a few minutes. What happens after the test?  · You will probably be able to go home right away. · You can go back to your usual activities right away. Follow-up care is a key part of your treatment and safety. Be sure to make and go to all appointments, and call your doctor if you are having problems. It's also a good idea to keep a list of the medicines you take. Ask your doctor when you can expect to have your test results. Where can you learn more? Go to http://narcisoWuzzufkhoi.info/. Enter L586 in the search box to learn more about \"Prothrombin Time: About This Test.\"  Current as of: September 21, 2016  Content Version: 11.4  © 2933-0729 [employer]. Care instructions adapted under license by [employer] (which disclaims liability or warranty for this information). If you have questions about a medical condition or this instruction, always ask your healthcare professional. [place] [address] any warranty or liability for your use of this information. I have discussed the diagnosis with the patient and the intended plan as seen in the above orders. The patient has received an After-Visit Summary and questions were answered concerning future plans.      Medication Side Effects and Warnings were discussed with patient: yes      CALVIN Vila  [facility]

## 2018-03-27 NOTE — TELEPHONE ENCOUNTER
----- Message from Louise Camp NP sent at 3/27/2018  9:43 AM EDT -----  Please let pt know to continue coumadin at current dosage.

## 2018-03-27 NOTE — TELEPHONE ENCOUNTER
Spoke with patient, confirmed name and . Advised patient of below message, per NP Jaleesa Bess. Patient verbalized understanding.      Be advised

## 2018-05-08 ENCOUNTER — OFFICE VISIT (OUTPATIENT)
Dept: FAMILY MEDICINE CLINIC | Age: 63
End: 2018-05-08

## 2018-05-08 ENCOUNTER — HOSPITAL ENCOUNTER (OUTPATIENT)
Dept: LAB | Age: 63
Discharge: HOME | End: 2018-05-08
Payer: MEDICARE

## 2018-05-08 VITALS
RESPIRATION RATE: 18 BRPM | HEART RATE: 95 BPM | BODY MASS INDEX: 39.17 KG/M2 | OXYGEN SATURATION: 94 % | HEIGHT: 75 IN | WEIGHT: 315 LBS | DIASTOLIC BLOOD PRESSURE: 79 MMHG | SYSTOLIC BLOOD PRESSURE: 147 MMHG | TEMPERATURE: 98.3 F

## 2018-05-08 DIAGNOSIS — Z00.00 MEDICARE ANNUAL WELLNESS VISIT, SUBSEQUENT: ICD-10-CM

## 2018-05-08 DIAGNOSIS — Z79.01 LONG TERM (CURRENT) USE OF ANTICOAGULANTS: ICD-10-CM

## 2018-05-08 DIAGNOSIS — Z12.5 SCREENING FOR PROSTATE CANCER: ICD-10-CM

## 2018-05-08 DIAGNOSIS — Z12.11 SCREENING FOR MALIGNANT NEOPLASM OF COLON: ICD-10-CM

## 2018-05-08 DIAGNOSIS — Z91.89 ENCOUNTER FOR HEPATITIS C VIRUS SCREENING TEST FOR HIGH RISK PATIENT: Primary | ICD-10-CM

## 2018-05-08 DIAGNOSIS — Z11.59 ENCOUNTER FOR HEPATITIS C VIRUS SCREENING TEST FOR HIGH RISK PATIENT: ICD-10-CM

## 2018-05-08 DIAGNOSIS — Z11.59 ENCOUNTER FOR HEPATITIS C VIRUS SCREENING TEST FOR HIGH RISK PATIENT: Primary | ICD-10-CM

## 2018-05-08 DIAGNOSIS — Z91.89 ENCOUNTER FOR HEPATITIS C VIRUS SCREENING TEST FOR HIGH RISK PATIENT: ICD-10-CM

## 2018-05-08 LAB
INR BLD: 2.3
PSA SERPL-MCNC: 0.6 NG/ML (ref 0–4)
PT POC: 27.3 SECONDS
VALID INTERNAL CONTROL?: YES

## 2018-05-08 PROCEDURE — 84153 ASSAY OF PSA TOTAL: CPT | Performed by: NURSE PRACTITIONER

## 2018-05-08 PROCEDURE — 86803 HEPATITIS C AB TEST: CPT | Performed by: NURSE PRACTITIONER

## 2018-05-08 NOTE — ACP (ADVANCE CARE PLANNING)
Advance Directive:  1. Do you have an advance directive in place? Patient Reply: no    2. If not, would you like material regarding how to put one in place? Patient Reply: no    3. Per patient no changes to their ACP contact n/a.

## 2018-05-08 NOTE — PROGRESS NOTES
Verified name and  with parent. Order verified in [facility] per [employer]. Informed patient of procedure, and rational, denies any questions. Successful cannulation of patient's vein (L antacubital) via # 23 Gauge  butterfly using aseptic technique no swelling noted. 2x2 and band-aid applied. Specimen sent to lab for further testing. Patient tolerated the procedure without incident.  Released from clinic

## 2018-05-08 NOTE — PROGRESS NOTES
Chief Complaint   [facility]   [facility]            Pt preferred language for health care discussion is English. Is someone accompanying this pt? no    Is the patient using any DME equipment during OV? no    Pt currently taking Antiplatelet therapy? Coumadin    Depression Screening:  PHQ over the last two weeks 5/8/2018 3/26/2018 3/2/2018 1/29/2018   Little interest or pleasure in doing things Not at all Not at all Not at all Not at all   Feeling down, depressed or hopeless Not at all Not at all Not at all Not at all   Total Score PHQ 2 0 0 0 0       Learning Assessment:  Learning Assessment 1/29/2018   PRIMARY LEARNER Patient   HIGHEST LEVEL OF EDUCATION - PRIMARY LEARNER  SOME COLLEGE   PRIMARY LANGUAGE ENGLISH   LEARNER PREFERENCE PRIMARY DEMONSTRATION   ANSWERED BY Ness Rosenbaum   RELATIONSHIP SELF       Abuse Screening:  Abuse Screening Questionnaire 1/29/2018   Do you ever feel afraid of your partner? N   Are you in a relationship with someone who physically or mentally threatens you? N   Is it safe for you to go home? Y       Health Maintenance reviewed and discussed and ordered per Provider. Pt is due for FIT test/Colonoscopy, Hep C screen, Pneumo-13 or Peumo-23, Tdap, Shingles Vax. Please order/place referral if appropriate. Any and all required ANTOINETTE forms filled out by patient and faxed, confirmation received. Coordination of Care:  1. Have you been to the ER, urgent care clinic since your last visit? Hospitalized since your last visit? no    2. Have you seen or consulted any other health care providers outside of the [address] [place] [address] since your last visit? Include any pap smears or colon screening.  no

## 2018-05-08 NOTE — PROGRESS NOTES
This is the Subsequent Medicare Annual Wellness Exam, performed 12 months or more after the Initial AWV or the last Subsequent AWV    I have reviewed the patient's medical history in detail and updated the computerized patient record. History     Past Medical History:   Diagnosis Date    Arthritis     Chronic pain     Hip post hip replacement    Lumbar discitis     Osteomyelitis of lumbar spine (HCC)     Thromboembolus ([address]     Transfusion history       Past Surgical History:   Procedure Laterality Date    HX ORTHOPAEDIC      TOTAL HIP ARTHROPLASTY      VASCULAR SURGERY PROCEDURE UNLIST  2012    x3     Current Outpatient Prescriptions   Medication Sig Dispense Refill    warfarin (COUMADIN) 10 mg tablet Take 1 Tab by mouth daily. Take 1 tablet by mouth on Tues and Thrurs   Indications: DEEP VEIN THROMBOSIS PREVENTION 30 Tab 5    carvedilol (COREG) 3.125 mg tablet Take  by mouth two (2) times daily (with meals).  furosemide (LASIX) 20 mg tablet Take  by mouth daily.  diphenhydrAMINE (BENADRYL) 25 mg capsule Take 25 mg by mouth every six (6) hours as needed.  methocarbamol (ROBAXIN) 500 mg tablet Take 1 Tab by mouth three (3) times daily. 90 Tab 2    oxyCODONE-acetaminophen (PERCOCET) 5-325 mg per tablet Take 1 Tab by mouth every six (6) hours as needed for Pain. Max Daily Amount: 4 Tabs. (Patient not taking: Reported on 5/8/2018) 56 Tab 0    warfarin (COUMADIN) 7.5 mg tablet Take 7.5 mg by mouth daily.  Take one tablet by mouth Mon, Wed, Fri, Sat and Sun   Indications: DEEP VEIN THROMBOSIS PREVENTION       Allergies   Allergen Reactions    Ciprofloxacin Anaphylaxis and Diarrhea    Morphine Itching     Does not alleviate pain    Onion Other (comments)     Elevated HTN and Body temp and cramping     Family History   Problem Relation Age of Onset    Bleeding Prob Mother     Parkinson's Disease Father      Social History   Substance Use Topics    Smoking status: Current Every Day Smoker     Packs/day: 0.50    Smokeless tobacco: Never Used    Alcohol use No     Patient Active Problem List   Diagnosis Code    Obesity, morbid ([address] E66.01    Lumbar discitis M46.46    Osteomyelitis of lumbar spine (HCC) M46.26    Chronic deep vein thrombosis (DVT) of proximal vein of both lower extremities (HCC) I82.5Y3       Depression Risk Factor Screening:     PHQ over the last two weeks 5/8/2018   Little interest or pleasure in doing things Not at all   Feeling down, depressed or hopeless Not at all   Total Score PHQ 2 0     Alcohol Risk Factor Screening: You do not drink alcohol or very rarely. Functional Ability and Level of Safety:   Hearing Loss  Hearing is good. Whisper Test done with normal results. Activities of Daily Living  The home contains: handrails and grab bars  Patient does total self care    Fall Risk  No flowsheet data found. Abuse Screen  Patient is not abused    Cognitive Screening   Evaluation of Cognitive Function:  Has your family/caregiver stated any concerns about your memory: no  Normal    Patient Care Team   Patient Care Team:  Fran Turcios NP as PCP - General (Nurse Practitioner)  Phys Other, MD    Assessment/Plan   Education and counseling provided:  Are appropriate based on today's review and evaluation  Prostate cancer screening tests (PSA, covered annually)  Colorectal cancer screening tests    Diagnoses and all orders for this visit:    1. Encounter for hepatitis C virus screening test for high risk patient  -     HEPATITIS C AB; Future    2. Long term (current) use of anticoagulants  -     AMB POC PT/INR    3. Screening for malignant neoplasm of colon  -     OCCULT BLOOD, IMMUNOASSAY (FIT); Future    4.  Medicare annual wellness visit, subsequent        Health Maintenance Due   Topic Date Due    Hepatitis C Screening  1955    Pneumococcal 19-64 Medium Risk (1 of 1 - PPSV23) 02/28/1975    DTaP/Tdap/Td series (1 - Tdap) 02/28/1977    FOBT Q 1 YEAR AGE 50-75  02/28/2006    ZOSTER VACCINE AGE 60>  12/29/2015    MEDICARE YEARLY EXAM  03/14/2018

## 2018-05-08 NOTE — MR AVS SNAPSHOT
[facility] [address] [address] [place] [address] 
[phone] Patient: Iraj Bull MRN: [id number] EPN:[id number]/[id number] Visit Information Date & Time Provider Department Dept. Phone Encounter #  
 5/8/2018  1:30 PM Andre GearyJULIA aleman [address] [facility] [address] [phone] [id number] Upcoming Health Maintenance Date Due Hepatitis C Screening 1955 Pneumococcal 19-64 Medium Risk (1 of 1 - PPSV23) 2/28/1975 DTaP/Tdap/Td series (1 - Tdap) 2/28/1977 FOBT Q 1 YEAR AGE 50-75 2/28/2006 ZOSTER VACCINE AGE 60> 12/29/2015 Influenza Age 5 to Adult 8/1/2018 MEDICARE YEARLY EXAM 5/9/2019 Allergies as of 5/8/2018  Review Complete On: 5/8/2018 By: Mariusz Mercedes LPN Severity Noted Reaction Type Reactions Ciprofloxacin High 11/27/2017    Anaphylaxis, Diarrhea Morphine  01/29/2018    Itching Does not alleviate pain Onion  01/29/2018    Other (comments) Elevated HTN and Body temp and cramping Current Immunizations  Never Reviewed No immunizations on file. Not reviewed this visit You Were Diagnosed With   
  
 Codes Comments Encounter for hepatitis C virus screening test for high risk patient    -  Primary ICD-10-CM: Z11.59, Z91.89 ICD-9-CM: V73.89 Long term (current) use of anticoagulants     ICD-10-CM: Z79.01 
ICD-9-CM: V58.61 Screening for malignant neoplasm of colon     ICD-10-CM: Z12.11 ICD-9-CM: V76.51 Medicare annual wellness visit, subsequent     ICD-10-CM: Z00.00 ICD-9-CM: V70.0 Screening for prostate cancer     ICD-10-CM: Z12.5 ICD-9-CM: V76.44 Vitals BP Pulse Temp Resp Height(growth percentile) Weight(growth percentile) 147/79 95 98.3 °F (36.8 °C) (Oral) 18 6' 3\" (1.905 m) 346 lb 9.6 oz (157.2 kg) SpO2 BMI Smoking Status 94% 43.32 kg/m2 Current Every Day Smoker BMI and BSA Data Body Mass Index Body Surface Area  
 43.32 kg/m 2 2.88 m 2 Preferred Pharmacy Pharmacy Name Phone [address] [place] [address] [phone] Your Updated Medication List  
  
   
This list is accurate as of 5/8/18  1:48 PM.  Always use your most recent med list.  
  
  
  
  
 BENADRYL 25 mg capsule Generic drug:  diphenhydrAMINE Take 25 mg by mouth every six (6) hours as needed. carvedilol 3.125 mg tablet Commonly known as:  Suellen Salts Take  by mouth two (2) times daily (with meals). furosemide 20 mg tablet Commonly known as:  LASIX Take  by mouth daily. methocarbamol 500 mg tablet Commonly known as:  ROBAXIN Take 1 Tab by mouth three (3) times daily. * warfarin 7.5 mg tablet Commonly known as:  COUMADIN Take 7.5 mg by mouth daily. Take one tablet by mouth Mon, Wed, Fri, Sat and Sun   Indications: DEEP VEIN THROMBOSIS PREVENTION  
  
 * warfarin 10 mg tablet Commonly known as:  COUMADIN Take 1 Tab by mouth daily. Take 1 tablet by mouth on Tues and Thrurs   Indications: DEEP VEIN THROMBOSIS PREVENTION  
  
 * Notice: This list has 2 medication(s) that are the same as other medications prescribed for you. Read the directions carefully, and ask your doctor or other care provider to review them with you. We Performed the Following AMB POC PT/INR [17798 CPT(R)] To-Do List   
 05/08/2018 Lab:  HEPATITIS C AB   
  
 05/08/2018 Lab:  PSA SCREENING (SCREENING)   
  
 06/07/2018 Lab:  OCCULT BLOOD, IMMUNOASSAY (FIT) Patient Instructions Medicare Wellness Visit, Male The best way to live healthy is to have a healthy lifestyle by eating a well-balanced diet, exercising regularly, limiting alcohol and stopping smoking. Regular physical exams and screening tests are another way to keep healthy.   
Preventive exams provided by your health care provider can find health problems before they become diseases or illnesses. Preventive services including immunizations, screening tests, monitoring and exams can help you take care of your own health. All people over age 72 should have a pneumovax  and and a prevnar shot to prevent pneumonia. These are once in a lifetime unless you and your provider decide differently. All people over 65 should have a yearly flu shot and a tetanus vaccine every 10 years. Screening for diabetes mellitus with a blood sugar test should be done every year. Glaucoma is a disease of the eye due to increased ocular pressure that can lead to blindness and it should be done every year by an eye professional. 
 
Cardiovascular screening tests that check for elevated lipids (fatty part of blood) which can lead to heart disease and strokes should be done every 5 years. Colorectal screening that evaluates for blood or polyps in your colon should be done yearly as a stool test or every five years as a flexible sigmoidoscope or every 10 years as a colonoscopy up to age 76. Men up to age 76 may need a screening blood test for prostate cancer at certain intervals, depending on their personal and family history. This decision is between the patient and his provider. If you have been a smoker or had family history of abdominal aortic aneurysms, you and your provider may decide to schedule an ultrasound test of your aorta. Hepatitis C screening is also recommended for anyone born between [birth year] through [address]. A shingles vaccine is also recommended once in a lifetime after age 61. Your Medicare Wellness Exam is recommended annually. Here is a list of your current Health Maintenance items with a due date: 
Health Maintenance Due Topic Date Due  
 Hepatitis C Test  1955  Pneumococcal Vaccine (1 of 1 - PPSV23) 02/28/1975  
 DTaP/Tdap/Td  (1 - Tdap) 02/28/1977  Stool testing for trace blood  02/28/2006  Shingles Vaccine  12/29/2015 [facility] Annual Well Visit  03/14/2018 Prostate Cancer Screening: Care Instructions Your Care Instructions The prostate gland is an organ found just below a man's bladder. It is the size and shape of a walnut. It surrounds the tube that carries urine from the bladder out of the body through the penis. This tube is called the urethra. Prostate cancer is the abnormal growth of cells in the prostate. It is the second most common type of cancer in men. (Skin cancer is the most common.) Most cases of prostate cancer occur in men older than 72. The disease runs in families. And it's more common in -American men. When it's found and treated early, prostate cancer may be cured. But it is not always treated. This is because prostate cancer may not shorten your life, especially if you are older and the cancer is growing slowly. Follow-up care is a key part of your treatment and safety. Be sure to make and go to all appointments, and call your doctor if you are having problems. It's also a good idea to know your test results and keep a list of the medicines you take. What are the screening tests for prostate cancer? The main screening test for prostate cancer is the prostate-specific antigen (PSA) test. This is a blood test that measures how much PSA is in your blood. A high level may mean that you have an enlargement, an infection, or cancer. Along with the PSA test, you may have a digital rectal exam. The digital (finger) rectal exam checks for anything abnormal in your prostate. To do the exam, the doctor puts a lubricated, gloved finger into your rectum. If these tests suggest cancer, you may need a prostate biopsy. How is prostate cancer diagnosed? In a biopsy, the doctor takes small tissue samples from your prostate gland. Another doctor then looks at the tissue under a microscope to see if there are cancer cells, signs of infection, or other problems.  The results help diagnose prostate cancer. What are the pros and cons of screening? Neither a PSA test nor a digital rectal exam can tell you for sure that you do or do not have cancer. But they can help you decide if you need more tests, such as a prostate biopsy. Screening tests may be useful because most men with prostate cancer don't have symptoms. It can be hard to know if you have cancer until it is more advanced. And then it's harder to treat. But having a PSA test can also cause harm. The test may show high levels of PSA that aren't caused by cancer. So you could have a prostate biopsy you didn't need. Or the PSA test might be normal when there is cancer, so a cancer might not be found early. The test can also find cancers that would never have caused a problem during your lifetime. So you might have treatment that was not needed. Prostate cancer usually develops late in life and grows slowly. For many men, it does not shorten their lives. Some experts advise screening only for men who are at high risk. Talk with your doctor to see if screening is right for you. Where can you learn more? Go to http://narciso-khoi.info/. Enter R550 in the search box to learn more about \"Prostate Cancer Screening: Care Instructions. \" Current as of: May 12, 2017 Content Version: 11.4 © 2894-6728 Attender. Care instructions adapted under license by [employer] (which disclaims liability or warranty for this information). If you have questions about a medical condition or this instruction, always ask your healthcare professional. [place] [address] any warranty or liability for your use of this information. Introducing \[facility] [place] [facility]\"" & HEALTH SERVICES! Laura Fisher introduces [employer] patient portal. Now you can access parts of your medical record, email your doctor's office, and request medication refills online.    
 
1. In your internet browser, go to [email or web address] 2. Click on the First Time User? Click Here link in the Sign In box. You will see the New Member Sign Up page. 3. Enter your Pro[employer] Access Code exactly as it appears below. You will not need to use this code after youve completed the sign-up process. If you do not sign up before the expiration date, you must request a new code. · Pro[employer] Access Code: [email or web address] Expires: 8/6/2018  1:48 PM 
 
4. Enter the last four digits of your Social Security Number (xxxx) and Date of Birth (mm/dd/yyyy) as indicated and click Submit. You will be taken to the next sign-up page. 5. Create a Coding Technologiest ID. This will be your Pro[employer]a login ID and cannot be changed, so think of one that is secure and easy to remember. 6. Create a Pro[employer]a password. You can change your password at any time. 7. Enter your Password Reset Question and Answer. This can be used at a later time if you forget your password. 8. Enter your e-mail address. You will receive e-mail notification when new information is available in [address] 9. Click Sign Up. You can now view and download portions of your medical record. 10. Click the Download Summary menu link to download a portable copy of your medical information. If you have questions, please visit the Frequently Asked Questions section of the Pro[employer]a website. Remember, Pro[employer]a is NOT to be used for urgent needs. For medical emergencies, dial 911. Now available from your iPhone and Android! Please provide this summary of care documentation to your next provider. Your primary care clinician is listed as Rayne Zuniga. If you have any questions after today's visit, please call [phone].

## 2018-05-08 NOTE — ACP (ADVANCE CARE PLANNING)
Advance Care Planning (ACP) Provider Note - Comprehensive     Date of ACP Conversation: 05/08/18  Persons included in Conversation:  patient  Length of ACP Conversation in minutes:  <16 minutes (Non-Billable)    Authorized Decision Maker (if patient is incapable of making informed decisions):    This person is:  wife, then son          General ACP for ALL Patients with Decision Making Capacity:   Importance of advance care planning, including choosing a healthcare agent to communicate patient's healthcare decisions if patient lost the ability to make decisions, such as after a sudden illness or accident    Review of Existing Advance Directive:  no existing documentation  The following items were discussed with the patient who verbalized understanding:    Understanding of medical condition      Interventions Provided:  Recommended completion of Advance Directive form after review of ACP materials and conversation with prospective healthcare agent

## 2018-05-08 NOTE — PATIENT INSTRUCTIONS
Medicare Wellness Visit, Male    The best way to live healthy is to have a healthy lifestyle by eating a well-balanced diet, exercising regularly, limiting alcohol and stopping smoking. Regular physical exams and screening tests are another way to keep healthy. Preventive exams provided by your health care provider can find health problems before they become diseases or illnesses. Preventive services including immunizations, screening tests, monitoring and exams can help you take care of your own health. All people over age 72 should have a pneumovax  and and a prevnar shot to prevent pneumonia. These are once in a lifetime unless you and your provider decide differently. All people over 65 should have a yearly flu shot and a tetanus vaccine every 10 years. Screening for diabetes mellitus with a blood sugar test should be done every year. Glaucoma is a disease of the eye due to increased ocular pressure that can lead to blindness and it should be done every year by an eye professional.    Cardiovascular screening tests that check for elevated lipids (fatty part of blood) which can lead to heart disease and strokes should be done every 5 years. Colorectal screening that evaluates for blood or polyps in your colon should be done yearly as a stool test or every five years as a flexible sigmoidoscope or every 10 years as a colonoscopy up to age 76. Men up to age 76 may need a screening blood test for prostate cancer at certain intervals, depending on their personal and family history. This decision is between the patient and his provider. If you have been a smoker or had family history of abdominal aortic aneurysms, you and your provider may decide to schedule an ultrasound test of your aorta. Hepatitis C screening is also recommended for anyone born between [birth year] through [address]. A shingles vaccine is also recommended once in a lifetime after age 61.     Your Medicare Wellness Exam is recommended annually. Here is a list of your current Health Maintenance items with a due date:  Health Maintenance Due   Topic Date Due    Hepatitis C Test  1955    Pneumococcal Vaccine (1 of 1 - PPSV23) 02/28/1975    DTaP/Tdap/Td  (1 - Tdap) 02/28/1977    Stool testing for trace blood  02/28/2006    Shingles Vaccine  12/29/2015    Annual Well Visit  03/14/2018            Prostate Cancer Screening: Care Instructions  Your Care Instructions    The prostate gland is an organ found just below a man's bladder. It is the size and shape of a walnut. It surrounds the tube that carries urine from the bladder out of the body through the penis. This tube is called the urethra. Prostate cancer is the abnormal growth of cells in the prostate. It is the second most common type of cancer in men. (Skin cancer is the most common.)  Most cases of prostate cancer occur in men older than 72. The disease runs in families. And it's more common in -American men. When it's found and treated early, prostate cancer may be cured. But it is not always treated. This is because prostate cancer may not shorten your life, especially if you are older and the cancer is growing slowly. Follow-up care is a key part of your treatment and safety. Be sure to make and go to all appointments, and call your doctor if you are having problems. It's also a good idea to know your test results and keep a list of the medicines you take. What are the screening tests for prostate cancer? The main screening test for prostate cancer is the prostate-specific antigen (PSA) test. This is a blood test that measures how much PSA is in your blood. A high level may mean that you have an enlargement, an infection, or cancer. Along with the PSA test, you may have a digital rectal exam. The digital (finger) rectal exam checks for anything abnormal in your prostate. To do the exam, the doctor puts a lubricated, gloved finger into your rectum.   If these tests suggest cancer, you may need a prostate biopsy. How is prostate cancer diagnosed? In a biopsy, the doctor takes small tissue samples from your prostate gland. Another doctor then looks at the tissue under a microscope to see if there are cancer cells, signs of infection, or other problems. The results help diagnose prostate cancer. What are the pros and cons of screening? Neither a PSA test nor a digital rectal exam can tell you for sure that you do or do not have cancer. But they can help you decide if you need more tests, such as a prostate biopsy. Screening tests may be useful because most men with prostate cancer don't have symptoms. It can be hard to know if you have cancer until it is more advanced. And then it's harder to treat. But having a PSA test can also cause harm. The test may show high levels of PSA that aren't caused by cancer. So you could have a prostate biopsy you didn't need. Or the PSA test might be normal when there is cancer, so a cancer might not be found early. The test can also find cancers that would never have caused a problem during your lifetime. So you might have treatment that was not needed. Prostate cancer usually develops late in life and grows slowly. For many men, it does not shorten their lives. Some experts advise screening only for men who are at high risk. Talk with your doctor to see if screening is right for you. Where can you learn more? Go to http://narciso-khoi.info/. Enter R550 in the search box to learn more about \"Prostate Cancer Screening: Care Instructions. \"  Current as of: May 12, 2017  Content Version: 11.4  © 9293-8737 CoreXchange. Care instructions adapted under license by [employer] (which disclaims liability or warranty for this information).  If you have questions about a medical condition or this instruction, always ask your healthcare professional. [address]  any warranty or liability for your use of this information.

## 2018-05-09 ENCOUNTER — TELEPHONE (OUTPATIENT)
Dept: FAMILY MEDICINE CLINIC | Age: 63
End: 2018-05-09

## 2018-05-09 LAB
HCV AB SER IA-ACNC: 0.02 INDEX
HCV AB SERPL QL IA: NEGATIVE
HCV COMMENT,HCGAC: NORMAL

## 2018-05-09 NOTE — TELEPHONE ENCOUNTER
----- Message from Dylon Brown NP sent at 5/9/2018  8:04 AM EDT -----  Please let pt know, PSA looks great

## 2018-05-09 NOTE — TELEPHONE ENCOUNTER
Spoke with patient, confirmed name and . Advised patient of below lab results, per NP Raymond Astudillo. Patient verbalized understanding.      Be advised

## 2018-05-10 ENCOUNTER — TELEPHONE (OUTPATIENT)
Dept: FAMILY MEDICINE CLINIC | Age: 63
End: 2018-05-10

## 2018-05-10 NOTE — TELEPHONE ENCOUNTER
----- Message from David Castanon, NP sent at 5/9/2018  1:38 PM EDT -----  Please let pt know Hep C negative

## 2018-05-10 NOTE — TELEPHONE ENCOUNTER
Spoke with patient, confirmed name and . Advised patient of below lab results, per NP Connor Ashley. Patient verbalized understanding.      Be advised

## 2018-07-23 DIAGNOSIS — Z86.718 HISTORY OF DVT IN ADULTHOOD: ICD-10-CM

## 2018-07-23 RX ORDER — WARFARIN 10 MG/1
10 TABLET ORAL DAILY
Qty: 30 TAB | Refills: 5 | Status: SHIPPED | OUTPATIENT
Start: 2018-07-23 | End: 2018-08-08 | Stop reason: DRUGHIGH

## 2018-07-23 NOTE — TELEPHONE ENCOUNTER
Pt is completley out of medication. Pt confirmed pharmacy on file. Requested Prescriptions     Pending Prescriptions Disp Refills    warfarin (COUMADIN) 10 mg tablet 30 Tab 5     Sig: Take 1 Tab by mouth daily.  Take 1 tablet by mouth on Tues and Thrurs   Indications: [address]

## 2018-07-23 NOTE — TELEPHONE ENCOUNTER
Per pharmacy, please confirm instructions for coumadin. \"Take 1 Tab by mouth daily.  Take 1 tablet by mouth on Tues and Thrurs\"

## 2018-07-23 NOTE — TELEPHONE ENCOUNTER
Pt states directions for medication warfarin should state take one tab by mouth daily only. States please call pharmacy to clarify. States took last tab today.  States need top/u medication by today

## 2018-07-24 NOTE — TELEPHONE ENCOUNTER
Pharmacy called stating that pt informed them that he takes 1 daily & an extra one on Tuesdays & Thursdays. I called pt to verify. He stated that if his INR is at a certain level then he will take an extra pill on Tuesday or Thursday. I advised pt that a LPN will need to contact the pharmacy to verify. He is currently out of his coumadin.

## 2018-08-07 ENCOUNTER — HOSPITAL ENCOUNTER (OUTPATIENT)
Dept: LAB | Age: 63
Discharge: HOME | End: 2018-08-07
Payer: MEDICARE

## 2018-08-07 ENCOUNTER — OFFICE VISIT (OUTPATIENT)
Dept: FAMILY MEDICINE CLINIC | Age: 63
End: 2018-08-07

## 2018-08-07 VITALS
OXYGEN SATURATION: 95 % | SYSTOLIC BLOOD PRESSURE: 146 MMHG | HEART RATE: 106 BPM | HEIGHT: 75 IN | RESPIRATION RATE: 18 BRPM | BODY MASS INDEX: 39.17 KG/M2 | TEMPERATURE: 98.7 F | WEIGHT: 315 LBS | DIASTOLIC BLOOD PRESSURE: 78 MMHG

## 2018-08-07 DIAGNOSIS — M46.26 OSTEOMYELITIS OF LUMBAR SPINE (HCC): ICD-10-CM

## 2018-08-07 DIAGNOSIS — Z79.01 ANTICOAGULATION GOAL OF INR 2.5 TO 3.5: ICD-10-CM

## 2018-08-07 DIAGNOSIS — Z51.81 ANTICOAGULATION GOAL OF INR 2.5 TO 3.5: ICD-10-CM

## 2018-08-07 DIAGNOSIS — I82.5Y3 CHRONIC DEEP VEIN THROMBOSIS (DVT) OF PROXIMAL VEIN OF BOTH LOWER EXTREMITIES (HCC): ICD-10-CM

## 2018-08-07 DIAGNOSIS — Z79.01 ANTICOAGULATION GOAL OF INR 2 TO 3: ICD-10-CM

## 2018-08-07 DIAGNOSIS — Z51.81 ANTICOAGULATION GOAL OF INR 2 TO 3: ICD-10-CM

## 2018-08-07 DIAGNOSIS — I82.5Y3 CHRONIC DEEP VEIN THROMBOSIS (DVT) OF PROXIMAL VEIN OF BOTH LOWER EXTREMITIES (HCC): Primary | ICD-10-CM

## 2018-08-07 DIAGNOSIS — E66.01 OBESITY, MORBID (HCC): ICD-10-CM

## 2018-08-07 LAB
INR PPP: 3.6 (ref 0.8–1.2)
PROTHROMBIN TIME: 36.7 SEC (ref 11.5–15.2)

## 2018-08-07 PROCEDURE — 85610 PROTHROMBIN TIME: CPT | Performed by: NURSE PRACTITIONER

## 2018-08-07 NOTE — MR AVS SNAPSHOT
[address] [place] [address] [address] [address] 
[phone] Patient: Ines Pal MRN: [id number] [id number] Visit Information Date & Time Provider Department Dept. Phone Encounter #  
 8/7/2018  4:00 PM Chanelle Dudley NP Siena Sepulveda [facility] [phone] [id number] Follow-up Instructions Return in about 3 months (around 11/7/2018). Upcoming Health Maintenance Date Due Pneumococcal 19-64 Medium Risk (1 of 1 - PPSV23) 2/28/1975 DTaP/Tdap/Td series (1 - Tdap) 2/28/1977 FOBT Q 1 YEAR AGE 50-75 2/28/2006 ZOSTER VACCINE AGE 60> 12/29/2015 Influenza Age 5 to Adult 8/1/2018 MEDICARE YEARLY EXAM 5/9/2019 Allergies as of 8/7/2018  Review Complete On: 8/7/2018 By: Chanelle Dudley NP Severity Noted Reaction Type Reactions Ciprofloxacin High 11/27/2017    Anaphylaxis, Diarrhea Morphine  01/29/2018    Itching Does not alleviate pain Onion  01/29/2018    Other (comments) Elevated HTN and Body temp and cramping Current Immunizations  Never Reviewed No immunizations on file. Not reviewed this visit You Were Diagnosed With   
  
 Codes Comments Chronic deep vein thrombosis (DVT) of proximal vein of both lower extremities (HCC)    -  Primary ICD-10-CM: J32.0Y9 ICD-9-CM: 453.51 Anticoagulation goal of INR 2.5 to 3.5     ICD-10-CM: Z51.81, Z79.01 
ICD-9-CM: V58.83, V58.61 Osteomyelitis of lumbar spine (HCC)     ICD-10-CM: M46.26 
ICD-9-CM: 730.28 Obesity, morbid ([address]     ICD-10-CM: E66.01 
ICD-9-CM: 278.01 Vitals BP Pulse Temp Resp Height(growth percentile) Weight(growth percentile) 146/78 (!) 106 98.7 °F (37.1 °C) (Oral) 18 6' 3\" (1.905 m) (!) 352 lb 3.2 oz (159.8 kg) SpO2 BMI Smoking Status 95% 44.02 kg/m2 Current Every Day Smoker BMI and BSA Data Body Mass Index Body Surface Area  44.02 kg/m 2 2.91 m 2  
 Preferred Pharmacy Pharmacy Name Phone [facility], [address], [address] [phone] Your Updated Medication List  
  
   
This list is accurate as of 8/7/18  4:46 PM.  Always use your most recent med list.  
  
  
  
  
 BENADRYL 25 mg capsule Generic drug:  diphenhydrAMINE Take 25 mg by mouth every six (6) hours as needed. carvedilol 3.125 mg tablet Commonly known as:  Monda Lovings Take  by mouth two (2) times daily (with meals). furosemide 20 mg tablet Commonly known as:  LASIX Take  by mouth daily. methocarbamol 500 mg tablet Commonly known as:  ROBAXIN Take 1 Tab by mouth three (3) times daily. * warfarin 7.5 mg tablet Commonly known as:  COUMADIN Take 7.5 mg by mouth daily. Take one tablet by mouth Mon, Wed, Fri, Sat and Sun   Indications: DEEP VEIN THROMBOSIS PREVENTION  
  
 * warfarin 10 mg tablet Commonly known as:  COUMADIN Take 1 Tab by mouth daily. Take 1 tablet by mouth on Tues and Thrurs   Indications: DEEP VEIN THROMBOSIS PREVENTION  
  
 * Notice: This list has 2 medication(s) that are the same as other medications prescribed for you. Read the directions carefully, and ask your doctor or other care provider to review them with you. Follow-up Instructions Return in about 3 months (around 11/7/2018). Patient Instructions Will let you know coumadin dosing once we get back INR. If INR not stable will need to have more frequent checks after you return to area. Make sure the  knows who is OK to  paperwork on Friday. Taking Warfarin Safely: Care Instructions Your Care Instructions Warfarin is a medicine that you take to prevent blood clots. It is often called a blood thinner. Doctors give warfarin (such as Coumadin) to reduce the risk of blood clots.  You may be at risk for blood clots if you have atrial fibrillation or deep vein thrombosis. Some other health problems may also put you at risk. Warfarin slows the amount of time it takes for your blood to clot. It can cause bleeding problems. Even if you've been taking warfarin for a while, it's important to know how to take it safely. Foods and other medicines can affect the way warfarin works. Some can make warfarin work too well. This can cause bleeding problems. And some can make it work poorly, so that it does not prevent blood clots very well. You will need regular blood tests to check how long it takes for your blood to form a clot. This test is called a PT or prothrombin time test. The result of the test is called an INR level. Depending on the test results, your doctor or anticoagulation clinic may adjust your dose of warfarin. Follow-up care is a key part of your treatment and safety. Be sure to make and go to all appointments, and call your doctor if you are having problems. It's also a good idea to know your test results and keep a list of the medicines you take. How can you care for yourself at home? Take warfarin safely · Take your warfarin at the same time each day. · If you miss a dose of warfarin, don't take an extra dose to make up for it. Your doctor can tell you exactly what to do so you don't take too much or too little. · Wear medical alert jewelry that lets others know that you take warfarin. You can buy this at most drugstores. · Don't take warfarin if you are pregnant or planning to get pregnant. Talk to your doctor about how you can prevent getting pregnant while you are taking it. · Don't change your dose or stop taking warfarin unless your doctor tells you to. Effects of medicines and food on warfarin · Don't start or stop taking any medicines, vitamins, or natural remedies unless you first talk to your doctor. Many medicines can affect how warfarin works.  These include aspirin and other pain relievers, over-the-counter medicines, multivitamins, dietary supplements, and herbal products. · Tell all of your doctors and pharmacists that you take warfarin. Some prescription medicines can affect how warfarin works. · Keep the amount of vitamin K in your diet about the same from day to day. Do not suddenly eat a lot more or a lot less food that is rich in vitamin K than you usually do. Vitamin K affects how warfarin works and how your blood clots. Talk with your doctor before making big changes in your diet. Foods that have a lot of vitamin K include cooked green vegetables, such as: ¨ Kale, spinach, turnip greens, cynthia greens, Swiss chard, and mustard greens. ¨ [place] sprouts, broccoli, and asparagus. · Limit your use of alcohol. Avoid bleeding by preventing falls and injuries · Wear slippers or shoes with nonskid soles. · Remove throw rugs and clutter. · Rearrange furniture and electrical cords to keep them out of walking paths. · Keep stairways, porches, and outside walkways well lit. Use night-lights in hallways and bathrooms. · Be extra careful when you work with sharp tools or knives. When should you call for help? Call 911 anytime you think you may need emergency care. For example, call if: 
  · You have a sudden, severe headache that is different from past headaches.  
 Call your doctor now or seek immediate medical care if: 
  · You have any abnormal bleeding, such as: 
¨ Nosebleeds. ¨ Vaginal bleeding that is different (heavier, more frequent, at a different time of the month) than what you are used to. ¨ Bloody or black stools, or rectal bleeding. ¨ Bloody or pink urine.  
 Watch closely for changes in your health, and be sure to contact your doctor if you have any problems. Where can you learn more? Go to http://narciso-khoi.info/. Enter P286 in the search box to learn more about \"Taking Warfarin Safely: Care Instructions. \" Current as of: December 6, 2017 Content Version: 11.7 © 2688-1799 [employer], [employer] Care instructions adapted under license by [employer] (which disclaims liability or warranty for this information). If you have questions about a medical condition or this instruction, always ask your healthcare professional. [address] any warranty or liability for your use of this information. Introducing [facility]! Elkin Jean introduces Tactilize patient portal. Now you can access parts of your medical record, email your doctor's office, and request medication refills online. 1. In your internet browser, go to [email or web address] 2. Click on the First Time User? Click Here link in the Sign In box. You will see the New Member Sign Up page. 3. Enter your Tactilize Access Code exactly as it appears below. You will not need to use this code after youve completed the sign-up process. If you do not sign up before the expiration date, you must request a new code. · Tactilize Access Code: [email or web address] Expires: 11/5/2018  4:46 PM 
 
4. Enter the last four digits of your Social Security Number (xxxx) and Date of Birth (mm/dd/yyyy) as indicated and click Submit. You will be taken to the next sign-up page. 5. Create a Tactilize ID. This will be your Tactilize login ID and cannot be changed, so think of one that is secure and easy to remember. 6. Create a Tactilize password. You can change your password at any time. 7. Enter your Password Reset Question and Answer. This can be used at a later time if you forget your password. 8. Enter your e-mail address. You will receive e-mail notification when new information is available in [address] 9. Click Sign Up. You can now view and download portions of your medical record. 10. Click the Download Summary menu link to download a portable copy of your medical information. If you have questions, please visit the Frequently Asked Questions section of the [employer] website. Remember, [employer] is NOT to be used for urgent needs. For medical emergencies, dial 911. Now available from your iPhone and Android! Please provide this summary of care documentation to your next provider. Your primary care clinician is listed as Francis Childs. If you have any questions after today's visit, please call [phone].

## 2018-08-07 NOTE — PROGRESS NOTES
Chief Complaint   Patient presents with    Form Completion    Medication Refill     Warfarin     he is a 59y.o. year old male who presents for evaluation of chronic DVT on coumadin, goal 2.0-3.0. Pt having housing/family issues and is living in [address] [place] [address] about 4 hrs away currently, has local apartment he will be moving into 8/17/2018, needs handicap paperwork so he can have 1st floor. He is unable to ambulate long distances 2/2 knee and back pain and sob on exertion. Taking coumadin 10 mg daily, checking INR today-3.6, will reduce to 7.5 mg daily and follow up when he returns to town in 2 weeks. Requesting handicap apartment paperwork    [phone]    Reviewed and agree with Nurse Note duplicated in this note. Reviewed PmHx, RxHx, FmHx, SocHx, AllgHx and updated in chart. Patient Labs were reviewed: yes    Patient Past Records were reviewed:  yes    Review of Systems - negative except as listed above    Objective:     Vitals:    08/07/18 1607   BP: 146/78   Pulse: (!) 106   Resp: 18   Temp: 98.7 °F (37.1 °C)   TempSrc: Oral   SpO2: 95%   Weight: (!) 352 lb 3.2 oz (159.8 kg)   Height: 6' 3\" (1.905 m)     Physical Examination: General appearance - alert, well appearing, and in no distress and chronically ill appearing  Mental status - alert, oriented to person, place, and time, anxious  Eyes - pupils equal and reactive, extraocular eye movements intact  Neck - supple, no significant adenopathy, carotids upstroke normal bilaterally, no bruits  Chest - clear to auscultation, no wheezes, rales or rhonchi, symmetric air entry, decreased BS B bases  Heart - normal rate, regular rhythm, normal S1, S2, no murmurs, rubs, clicks or gallops  Abdomen - soft, nontender, nondistended, no masses or organomegaly  Extremities - pedal edema 2 +, extends to mid calf, ambulates with cane    Assessment/ Plan:   Diagnoses and all orders for this visit:    1.  Chronic deep vein thrombosis (DVT) of proximal vein of both lower extremities (HCC)  -     PROTHROMBIN TIME + INR; Future  -     warfarin (COUMADIN) 7.5 mg tablet; Take one tablet by mouth daily. Indications: DEEP VEIN THROMBOSIS PREVENTION    2. Osteomyelitis of lumbar spine (HCC)    3. Obesity, morbid ([address]    4. Anticoagulation goal of INR 2 to 3  -     warfarin (COUMADIN) 7.5 mg tablet; Take one tablet by mouth daily. Indications: DEEP VEIN THROMBOSIS PREVENTION       Encounter Diagnoses   Name Primary?  Chronic deep vein thrombosis (DVT) of proximal vein of both lower extremities (HCC) Yes    Osteomyelitis of lumbar spine (HCC)     Obesity, morbid (HCC)     Anticoagulation goal of INR 2 to 3      Orders Placed This Encounter    PROTHROMBIN TIME + INR    warfarin (COUMADIN) 7.5 mg tablet     Follow-up Disposition:  Return in about 3 months (around 11/7/2018). Patient Instructions     Will let you know coumadin dosing once we get back INR. If INR not stable will need to have more frequent checks after you return to area. Make sure the  knows who is OK to  paperwork on Friday. Taking Warfarin Safely: Care Instructions  Your Care Instructions    Warfarin is a medicine that you take to prevent blood clots. It is often called a blood thinner. Doctors give warfarin (such as Coumadin) to reduce the risk of blood clots. You may be at risk for blood clots if you have atrial fibrillation or deep vein thrombosis. Some other health problems may also put you at risk. Warfarin slows the amount of time it takes for your blood to clot. It can cause bleeding problems. Even if you've been taking warfarin for a while, it's important to know how to take it safely. Foods and other medicines can affect the way warfarin works. Some can make warfarin work too well. This can cause bleeding problems. And some can make it work poorly, so that it does not prevent blood clots very well.   You will need regular blood tests to check how long it takes for your blood to form a clot. This test is called a PT or prothrombin time test. The result of the test is called an INR level. Depending on the test results, your doctor or anticoagulation clinic may adjust your dose of warfarin. Follow-up care is a key part of your treatment and safety. Be sure to make and go to all appointments, and call your doctor if you are having problems. It's also a good idea to know your test results and keep a list of the medicines you take. How can you care for yourself at home? Take warfarin safely  · Take your warfarin at the same time each day. · If you miss a dose of warfarin, don't take an extra dose to make up for it. Your doctor can tell you exactly what to do so you don't take too much or too little. · Wear medical alert jewelry that lets others know that you take warfarin. You can buy this at most drugstores. · Don't take warfarin if you are pregnant or planning to get pregnant. Talk to your doctor about how you can prevent getting pregnant while you are taking it. · Don't change your dose or stop taking warfarin unless your doctor tells you to. Effects of medicines and food on warfarin  · Don't start or stop taking any medicines, vitamins, or natural remedies unless you first talk to your doctor. Many medicines can affect how warfarin works. These include aspirin and other pain relievers, over-the-counter medicines, multivitamins, dietary supplements, and herbal products. · Tell all of your doctors and pharmacists that you take warfarin. Some prescription medicines can affect how warfarin works. · Keep the amount of vitamin K in your diet about the same from day to day. Do not suddenly eat a lot more or a lot less food that is rich in vitamin K than you usually do. Vitamin K affects how warfarin works and how your blood clots. Talk with your doctor before making big changes in your diet.  Foods that have a lot of vitamin K include cooked green vegetables, such as:  ¨ Kale, spinach, turnip greens, cynthia greens, Swiss chard, and mustard greens. ¨ [place] sprouts, broccoli, and asparagus. · Limit your use of alcohol. Avoid bleeding by preventing falls and injuries  · Wear slippers or shoes with nonskid soles. · Remove throw rugs and clutter. · Rearrange furniture and electrical cords to keep them out of walking paths. · Keep stairways, porches, and outside walkways well lit. Use night-lights in hallways and bathrooms. · Be extra careful when you work with sharp tools or knives. When should you call for help? Call 911 anytime you think you may need emergency care. For example, call if:    · You have a sudden, severe headache that is different from past headaches.    Call your doctor now or seek immediate medical care if:    · You have any abnormal bleeding, such as:  ¨ Nosebleeds. ¨ Vaginal bleeding that is different (heavier, more frequent, at a different time of the month) than what you are used to. ¨ Bloody or black stools, or rectal bleeding. ¨ Bloody or pink urine.    Watch closely for changes in your health, and be sure to contact your doctor if you have any problems. Where can you learn more? Go to http://narciso-khoi.info/. Enter V178 in the search box to learn more about \"Taking Warfarin Safely: Care Instructions. \"  Current as of: December 6, 2017  Content Version: 11.7  © 1885-5565 [employer]. Care instructions adapted under license by [employer] (which disclaims liability or warranty for this information). If you have questions about a medical condition or this instruction, always ask your healthcare professional. [place] [address] any warranty or liability for your use of this information. I have discussed the diagnosis with the patient and the intended plan as seen in the above orders. The patient has received an After-Visit Summary and questions were answered concerning future plans. Medication Side Effects and Warnings were discussed with patient: yes      Maxine Dunaway, NP-C  [employer]

## 2018-08-07 NOTE — PATIENT INSTRUCTIONS
Will let you know coumadin dosing once we get back INR. If INR not stable will need to have more frequent checks after you return to area. Make sure the  knows who is OK to  paperwork on Friday. Taking Warfarin Safely: Care Instructions  Your Care Instructions    Warfarin is a medicine that you take to prevent blood clots. It is often called a blood thinner. Doctors give warfarin (such as Coumadin) to reduce the risk of blood clots. You may be at risk for blood clots if you have atrial fibrillation or deep vein thrombosis. Some other health problems may also put you at risk. Warfarin slows the amount of time it takes for your blood to clot. It can cause bleeding problems. Even if you've been taking warfarin for a while, it's important to know how to take it safely. Foods and other medicines can affect the way warfarin works. Some can make warfarin work too well. This can cause bleeding problems. And some can make it work poorly, so that it does not prevent blood clots very well. You will need regular blood tests to check how long it takes for your blood to form a clot. This test is called a PT or prothrombin time test. The result of the test is called an INR level. Depending on the test results, your doctor or anticoagulation clinic may adjust your dose of warfarin. Follow-up care is a key part of your treatment and safety. Be sure to make and go to all appointments, and call your doctor if you are having problems. It's also a good idea to know your test results and keep a list of the medicines you take. How can you care for yourself at home? Take warfarin safely  · Take your warfarin at the same time each day. · If you miss a dose of warfarin, don't take an extra dose to make up for it. Your doctor can tell you exactly what to do so you don't take too much or too little. · Wear medical alert jewelry that lets others know that you take warfarin.  You can buy this at most drugstores. · Don't take warfarin if you are pregnant or planning to get pregnant. Talk to your doctor about how you can prevent getting pregnant while you are taking it. · Don't change your dose or stop taking warfarin unless your doctor tells you to. Effects of medicines and food on warfarin  · Don't start or stop taking any medicines, vitamins, or natural remedies unless you first talk to your doctor. Many medicines can affect how warfarin works. These include aspirin and other pain relievers, over-the-counter medicines, multivitamins, dietary supplements, and herbal products. · Tell all of your doctors and pharmacists that you take warfarin. Some prescription medicines can affect how warfarin works. · Keep the amount of vitamin K in your diet about the same from day to day. Do not suddenly eat a lot more or a lot less food that is rich in vitamin K than you usually do. Vitamin K affects how warfarin works and how your blood clots. Talk with your doctor before making big changes in your diet. Foods that have a lot of vitamin K include cooked green vegetables, such as:  ¨ Kale, spinach, turnip greens, cynthia greens, Swiss chard, and mustard greens. ¨ [place] sprouts, broccoli, and asparagus. · Limit your use of alcohol. Avoid bleeding by preventing falls and injuries  · Wear slippers or shoes with nonskid soles. · Remove throw rugs and clutter. · Rearrange furniture and electrical cords to keep them out of walking paths. · Keep stairways, porches, and outside walkways well lit. Use night-lights in hallways and bathrooms. · Be extra careful when you work with sharp tools or knives. When should you call for help? Call 911 anytime you think you may need emergency care. For example, call if:    · You have a sudden, severe headache that is different from past headaches.    Call your doctor now or seek immediate medical care if:    · You have any abnormal bleeding, such as:  ¨ Nosebleeds.   ¨ Vaginal bleeding that is different (heavier, more frequent, at a different time of the month) than what you are used to. ¨ Bloody or black stools, or rectal bleeding. ¨ Bloody or pink urine.    Watch closely for changes in your health, and be sure to contact your doctor if you have any problems. Where can you learn more? Go to http://narciso-khoi.info/. Enter K677 in the search box to learn more about \"Taking Warfarin Safely: Care Instructions. \"  Current as of: December 6, 2017  Content Version: 11.7  © 7272-9399 [employer] Care instructions adapted under license by [employer] (which disclaims liability or warranty for this information). If you have questions about a medical condition or this instruction, always ask your healthcare professional. [address] any warranty or liability for your use of this information.

## 2018-08-08 ENCOUNTER — TELEPHONE (OUTPATIENT)
Dept: FAMILY MEDICINE CLINIC | Age: 63
End: 2018-08-08

## 2018-08-08 RX ORDER — WARFARIN 7.5 MG/1
TABLET ORAL
Qty: 90 TAB | Refills: 0 | Status: SHIPPED | OUTPATIENT
Start: 2018-08-08 | End: 2018-11-26 | Stop reason: SDUPTHER

## 2018-08-08 NOTE — PROGRESS NOTES
Please let pt know, will change his current coumadin dosing to 7.5 mg daily, recheck when he is back in town in 2 weeks. I will send in new rx.

## 2018-08-08 NOTE — TELEPHONE ENCOUNTER
Tried to contact patient to inform him of his Coumadin dosage change but patient's number and wife's number are not in service and the daughters number is not accepting calls and cannot leave a message at this time.

## 2018-08-08 NOTE — TELEPHONE ENCOUNTER
----- Message from Sherren Silos, NP sent at 8/8/2018  8:00 AM EDT -----  Please let pt know, will change his current coumadin dosing to 7.5 mg daily, recheck when he is back in town in 2 weeks. I will send in new rx.

## 2018-08-08 NOTE — TELEPHONE ENCOUNTER
Tried to contact patients daughter again, since patients phone is disconnected, but a recording states that the person is unavailable and does not allow messages.

## 2018-08-09 NOTE — TELEPHONE ENCOUNTER
Attempted to contact patient via his daughters phone again. Still is not accepting phone calls. Unable to reach patient.

## 2018-08-10 ENCOUNTER — TELEPHONE (OUTPATIENT)
Dept: FAMILY MEDICINE CLINIC | Age: 63
End: 2018-08-10

## 2018-08-10 NOTE — TELEPHONE ENCOUNTER
Pt's wife Royer Lin stopped by office to  pt's handicap paperwork. Advised Lucia Bishop locate paperwork. Royer rivas was told by pt paperwork would be ready for p/u at  by Friday. Royer rivas need paperwork so she can  her keys to the new place.  will stop by office on mon 08/13/18 after she get off of work around 4 pm to have [address] complete form.

## 2018-11-26 ENCOUNTER — CLINICAL SUPPORT (OUTPATIENT)
Dept: FAMILY MEDICINE CLINIC | Age: 63
End: 2018-11-26

## 2018-11-26 DIAGNOSIS — Z51.81 ANTICOAGULATION GOAL OF INR 2 TO 3: ICD-10-CM

## 2018-11-26 DIAGNOSIS — Z79.01 ANTICOAGULATION GOAL OF INR 2 TO 3: ICD-10-CM

## 2018-11-26 DIAGNOSIS — I82.5Y3 CHRONIC DEEP VEIN THROMBOSIS (DVT) OF PROXIMAL VEIN OF BOTH LOWER EXTREMITIES (HCC): Primary | ICD-10-CM

## 2018-11-26 LAB
INR BLD: 1.8
PT POC: 21.7 SECONDS
VALID INTERNAL CONTROL?: YES

## 2018-11-26 RX ORDER — WARFARIN 7.5 MG/1
TABLET ORAL
Qty: 30 TAB | Refills: 0 | Status: SHIPPED | OUTPATIENT
Start: 2018-11-26 | End: 2018-12-14 | Stop reason: SDUPTHER

## 2018-11-26 NOTE — PROGRESS NOTES
Don Jurado is a 59 y.o. male who presents today for Anticoagulation monitoring. Indication: DVT  INR Goal: 2.0-3.0  Current dose:  Coumadin 7.5 mg daily. Missed Coumadin Doses:  Yes, 2 missed doses ran out or medications  Medication Changes:  no  Dietary Changes:  no    Symptoms: taking coumadin appropriately without any bleeding. Latest INRs:  Lab Results   Component Value Date/Time    INR 3.6 (H) 08/07/2018 04:20 PM    INR 3.0 (H) 03/26/2018 04:32 PM    INR 2.3 (H) 12/05/2017 04:20 AM    INR POC 2.3 05/08/2018 01:30 PM    Prothrombin time 36.7 (H) 08/07/2018 04:20 PM    Prothrombin time 30.6 (H) 03/26/2018 04:32 PM    Prothrombin time 25.7 (H) 12/05/2017 04:20 AM        New Coumadin dose: no change in therapy, go back to 7.5 mg daily. Next check to be scheduled for  1 weeks.

## 2018-12-12 ENCOUNTER — HOSPITAL ENCOUNTER (OUTPATIENT)
Dept: LAB | Age: 63
Discharge: HOME | End: 2018-12-12
Payer: MEDICARE

## 2018-12-12 ENCOUNTER — OFFICE VISIT (OUTPATIENT)
Dept: FAMILY MEDICINE CLINIC | Age: 63
End: 2018-12-12

## 2018-12-12 VITALS
HEART RATE: 109 BPM | HEIGHT: 75 IN | OXYGEN SATURATION: 99 % | RESPIRATION RATE: 18 BRPM | DIASTOLIC BLOOD PRESSURE: 79 MMHG | WEIGHT: 315 LBS | SYSTOLIC BLOOD PRESSURE: 151 MMHG | TEMPERATURE: 96.5 F | BODY MASS INDEX: 39.17 KG/M2

## 2018-12-12 DIAGNOSIS — E66.01 OBESITY, MORBID (HCC): ICD-10-CM

## 2018-12-12 DIAGNOSIS — I48.0 PAROXYSMAL ATRIAL FIBRILLATION (HCC): ICD-10-CM

## 2018-12-12 DIAGNOSIS — Z79.01 ANTICOAGULANT LONG-TERM USE: ICD-10-CM

## 2018-12-12 DIAGNOSIS — I82.5Y3 CHRONIC DEEP VEIN THROMBOSIS (DVT) OF PROXIMAL VEIN OF BOTH LOWER EXTREMITIES (HCC): Primary | ICD-10-CM

## 2018-12-12 DIAGNOSIS — J32.1 CHRONIC FRONTAL SINUSITIS: ICD-10-CM

## 2018-12-12 DIAGNOSIS — I82.5Y3 CHRONIC DEEP VEIN THROMBOSIS (DVT) OF PROXIMAL VEIN OF BOTH LOWER EXTREMITIES (HCC): ICD-10-CM

## 2018-12-12 LAB
ALBUMIN SERPL-MCNC: 3.7 G/DL (ref 3.4–5)
ALBUMIN/GLOB SERPL: 0.9 {RATIO} (ref 0.8–1.7)
ALP SERPL-CCNC: 99 U/L (ref 45–117)
ALT SERPL-CCNC: 17 U/L (ref 16–61)
ANION GAP SERPL CALC-SCNC: 8 MMOL/L (ref 3–18)
AST SERPL-CCNC: 13 U/L (ref 15–37)
BILIRUB SERPL-MCNC: 0.3 MG/DL (ref 0.2–1)
BUN SERPL-MCNC: 15 MG/DL (ref 7–18)
BUN/CREAT SERPL: 17 (ref 12–20)
CALCIUM SERPL-MCNC: 8.9 MG/DL (ref 8.5–10.1)
CHLORIDE SERPL-SCNC: 102 MMOL/L (ref 100–108)
CO2 SERPL-SCNC: 27 MMOL/L (ref 21–32)
CREAT SERPL-MCNC: 0.88 MG/DL (ref 0.6–1.3)
GLOBULIN SER CALC-MCNC: 3.9 G/DL (ref 2–4)
GLUCOSE SERPL-MCNC: 102 MG/DL (ref 74–99)
INR PPP: 1.9 (ref 0.8–1.2)
POTASSIUM SERPL-SCNC: 4.6 MMOL/L (ref 3.5–5.5)
PROT SERPL-MCNC: 7.6 G/DL (ref 6.4–8.2)
PROTHROMBIN TIME: 21.5 SEC (ref 11.5–15.2)
SODIUM SERPL-SCNC: 137 MMOL/L (ref 136–145)

## 2018-12-12 PROCEDURE — 85610 PROTHROMBIN TIME: CPT

## 2018-12-12 PROCEDURE — 80053 COMPREHEN METABOLIC PANEL: CPT

## 2018-12-12 RX ORDER — AZITHROMYCIN 250 MG/1
TABLET, FILM COATED ORAL
Qty: 6 TAB | Refills: 0 | Status: SHIPPED | OUTPATIENT
Start: 2018-12-12 | End: 2018-12-17

## 2018-12-12 NOTE — PROGRESS NOTES
HISTORY OF PRESENT ILLNESS  Zuleika Scott is a 59 y.o. male. Patient is here to meet his new PCP after being with a PCP that left the practice. He is on coumadin 7.5 mg daily and last INR was 1.8  At the end of November and I will order another level today to make sure he is in range. He has been taking his coumadin and this is the only medication he takes. Patient does not want to have his additional blood work done today as he has a sinus irritation. Other   The history is provided by the patient. The problem occurs constantly. The problem has not changed since onset. Pertinent negatives include no chest pain, no abdominal pain, no headaches and no shortness of breath. The symptoms are aggravated by stress. The symptoms are relieved by medications. Treatments tried: stable on meds. The treatment provided moderate relief. Sinus Infection    The history is provided by the patient. This is a chronic problem. The problem has been gradually worsening. There has been no fever. The pain is at a severity of 5/10. The pain is moderate. Associated symptoms include congestion, sinus pressure, sore throat and cough. Pertinent negatives include no chills, no shortness of breath, no headaches and no chest pain. Associated symptoms comments: Cough with green expectoration . Treatments tried: benadryl. The treatment provided no relief. Review of Systems   Constitutional: Negative for chills, fever and malaise/fatigue. HENT: Positive for congestion, sinus pressure and sore throat. Negative for hearing loss. Eyes: Negative for blurred vision, double vision and discharge. Respiratory: Positive for cough and sputum production. Negative for shortness of breath and wheezing. Cardiovascular: Negative for chest pain. Gastrointestinal: Negative for abdominal pain, constipation, diarrhea, nausea and vomiting. Genitourinary: Negative for dysuria and frequency. Musculoskeletal: Negative for joint pain and myalgias. Neurological: Negative for dizziness, tingling, weakness and headaches. Endo/Heme/Allergies: Negative for environmental allergies. Psychiatric/Behavioral: Negative for depression. The patient is not nervous/anxious. Visit Vitals  /79   Pulse (!) 109   Temp 96.5 °F (35.8 °C) (Oral)   Resp 18   Ht 6' 3\" (1.905 m)   Wt (!) 373 lb (169.2 kg)   SpO2 99%   BMI 46.62 kg/m²       Physical Exam   Constitutional: He is oriented to person, place, and time. He appears well-developed and well-nourished. No distress. HENT:   Head: Normocephalic and atraumatic. Right Ear: External ear normal.   Left Ear: External ear normal.   Nose: Right sinus exhibits maxillary sinus tenderness and frontal sinus tenderness. Left sinus exhibits maxillary sinus tenderness and frontal sinus tenderness. Mouth/Throat: Posterior oropharyngeal erythema present. No oropharyngeal exudate. Eyes: EOM are normal. Pupils are equal, round, and reactive to light. No scleral icterus. Neck: Normal range of motion. No thyromegaly present. Cardiovascular: Normal rate, regular rhythm and normal heart sounds. Pulmonary/Chest: Effort normal and breath sounds normal. No respiratory distress. He has no wheezes. Abdominal: Soft. Bowel sounds are normal. He exhibits no distension. There is no tenderness. Lymphadenopathy:     He has no cervical adenopathy. Neurological: He is alert and oriented to person, place, and time. Psychiatric: He has a normal mood and affect. ASSESSMENT and PLAN  Long term anticoagulant use:  - blood work for PT/INR  - Please continue current medication   Obesity :  - Lifestyle modification is necessary to ensure weight loss and this includes diet , exercise and behavioral modification.  - Diet is geared towards balanced low-calorie diets/portion-controlled diets, low fat diet , low carbohydrate diet and mediterranean diet. Start with 1500 kcal diet.   -Exercise 30 minutes 3-5 times a week   - Behavioral modification helps in controlling eating behavior  - Consider surgery only if well-informed and motivated, BMI ? 40 kg/m2,have acceptable risk for surgery, failed previous non-surgical weight loss, adults with a BMI ? 35 kg/m2 comorbidities such as severe diabetes, sleep apnea, or joint disease may also be candidates. Sinusitis :  1) Ok to take tylenol / motrin to relieve pain. 2) Please finish course of antibiotics , explained side effects and patient verbalizes understanding. 3) Mucolytic's such as guaifenesin which can thin out the mucous. 4) Use nasal steroid inhaler and anti-allergy medication. 5) Can use nasal saline spray or Neti-pot. 6) Please keep a humidifier in your bedroom. 7) Patient instructions and information on diagnosis to be handed out.

## 2018-12-12 NOTE — PROGRESS NOTES
Patient is here to transfer care from [facility] to Dr. Cecilia Acosta. 1. Have you been to the ER, urgent care clinic since your last visit? Hospitalized since your last visit?no    2. Have you seen or consulted any other health care providers outside of the [address] [place] [address] since your last visit? Include any pap smears or colon screening.  no

## 2018-12-13 ENCOUNTER — TELEPHONE (OUTPATIENT)
Dept: FAMILY MEDICINE CLINIC | Age: 63
End: 2018-12-13

## 2018-12-13 NOTE — TELEPHONE ENCOUNTER
Provider called patient in the morning. However provider stated the number was not excepting calls at this time. Provider stated she will call him again.

## 2018-12-13 NOTE — TELEPHONE ENCOUNTER
Pt would like Dr Hernandez Singer or Jackelyn Hodge to call back with testing results. He said he was told yesterday to call in today. He is upset that I could give him what he wants. Please call ASAP.

## 2018-12-14 ENCOUNTER — TELEPHONE (OUTPATIENT)
Dept: FAMILY MEDICINE CLINIC | Age: 63
End: 2018-12-14

## 2018-12-14 DIAGNOSIS — I82.5Y3 CHRONIC DEEP VEIN THROMBOSIS (DVT) OF PROXIMAL VEIN OF BOTH LOWER EXTREMITIES (HCC): ICD-10-CM

## 2018-12-14 DIAGNOSIS — Z51.81 ANTICOAGULATION GOAL OF INR 2 TO 3: ICD-10-CM

## 2018-12-14 DIAGNOSIS — Z79.01 ANTICOAGULATION GOAL OF INR 2 TO 3: ICD-10-CM

## 2018-12-14 RX ORDER — WARFARIN 7.5 MG/1
TABLET ORAL
Qty: 30 TAB | Refills: 0 | Status: SHIPPED | OUTPATIENT
Start: 2018-12-14 | End: 2019-01-14 | Stop reason: SDUPTHER

## 2018-12-14 NOTE — TELEPHONE ENCOUNTER
Discussed treatment with patient and lab results. He had been advised to take one  additional tablet today and then go back to taking 7.5 mg daily once a day from Saturday onwards. He will come back to have his INT repeated next week.

## 2019-01-11 ENCOUNTER — LAB ONLY (OUTPATIENT)
Dept: FAMILY MEDICINE CLINIC | Age: 64
End: 2019-01-11

## 2019-01-11 ENCOUNTER — HOSPITAL ENCOUNTER (OUTPATIENT)
Dept: LAB | Age: 64
Discharge: HOME | End: 2019-01-11
Payer: MEDICARE

## 2019-01-11 DIAGNOSIS — Z01.89 ROUTINE LAB DRAW: ICD-10-CM

## 2019-01-11 DIAGNOSIS — Z01.89 ROUTINE LAB DRAW: Primary | ICD-10-CM

## 2019-01-11 LAB
INR PPP: 2.3 (ref 0.8–1.2)
PROTHROMBIN TIME: 25.1 SEC (ref 11.5–15.2)

## 2019-01-11 PROCEDURE — 85610 PROTHROMBIN TIME: CPT

## 2019-01-11 NOTE — PROGRESS NOTES
Verified name and  with parent. Order verified in [address] [facility] per [employer]. Informed patient of procedure, and rational, denies any questions. Successful cannulation of patient's vein (L antacubital) via # 23 Gauge  butterfly using aseptic technique no swelling noted. 2x2 and band-aid applied. Specimens 1 blue tube sent to lab for further testing Patient tolerated the procedure without incident.  Released from clinic

## 2019-01-14 DIAGNOSIS — Z79.01 ANTICOAGULATION GOAL OF INR 2 TO 3: ICD-10-CM

## 2019-01-14 DIAGNOSIS — I82.5Y3 CHRONIC DEEP VEIN THROMBOSIS (DVT) OF PROXIMAL VEIN OF BOTH LOWER EXTREMITIES (HCC): ICD-10-CM

## 2019-01-14 DIAGNOSIS — Z51.81 ANTICOAGULATION GOAL OF INR 2 TO 3: ICD-10-CM

## 2019-01-14 RX ORDER — WARFARIN 7.5 MG/1
TABLET ORAL
Qty: 30 TAB | Refills: 0 | Status: SHIPPED | OUTPATIENT
Start: 2019-01-14 | End: 2019-03-29 | Stop reason: SDUPTHER

## 2019-01-14 NOTE — TELEPHONE ENCOUNTER
Patient requesting refill on warfarin. Patient stated he takes (2) tablets of 7.5 mg every Friday and all other days 7.5 mg.  Patient labs were drawn on Friday and they are in. PT 25.1 H and INR 2.3 H.

## 2019-01-15 ENCOUNTER — TELEPHONE (OUTPATIENT)
Dept: FAMILY MEDICINE CLINIC | Age: 64
End: 2019-01-15

## 2019-01-15 RX ORDER — WARFARIN 10 MG/1
10 TABLET ORAL DAILY
Qty: 60 TAB | Refills: 0 | Status: SHIPPED | OUTPATIENT
Start: 2019-01-15 | End: 2019-03-29 | Stop reason: SDUPTHER

## 2019-01-15 NOTE — TELEPHONE ENCOUNTER
Called patient and discussed INR testing . He would like to go up on coumadin as he mentions he had the clots last time his INR was 2.3. I have advised ten mg daily.

## 2019-03-29 ENCOUNTER — LAB ONLY (OUTPATIENT)
Dept: FAMILY MEDICINE CLINIC | Age: 64
End: 2019-03-29

## 2019-03-29 ENCOUNTER — HOSPITAL ENCOUNTER (OUTPATIENT)
Dept: LAB | Age: 64
Discharge: HOME | End: 2019-03-29
Payer: MEDICARE

## 2019-03-29 DIAGNOSIS — Z79.01 ANTICOAGULATION GOAL OF INR 2 TO 3: ICD-10-CM

## 2019-03-29 DIAGNOSIS — I82.5Y3 CHRONIC DEEP VEIN THROMBOSIS (DVT) OF PROXIMAL VEIN OF BOTH LOWER EXTREMITIES (HCC): Primary | ICD-10-CM

## 2019-03-29 DIAGNOSIS — I82.5Y3 CHRONIC DEEP VEIN THROMBOSIS (DVT) OF PROXIMAL VEIN OF BOTH LOWER EXTREMITIES (HCC): ICD-10-CM

## 2019-03-29 DIAGNOSIS — Z51.81 ANTICOAGULATION GOAL OF INR 2 TO 3: ICD-10-CM

## 2019-03-29 LAB
INR PPP: 4.8 (ref 0.8–1.2)
PROTHROMBIN TIME: 46.4 SEC (ref 11.5–15.2)

## 2019-03-29 PROCEDURE — 85610 PROTHROMBIN TIME: CPT

## 2019-03-29 NOTE — PROGRESS NOTES
Patient presents for lab draw ordered by:    Ordering Provider:  Davi Carr Department/Practice:  [address] [facility]  Phone:  [phone]  Date Ordered:  3/2019    The following labs were drawn and sent to [facility]  by Shorty Aguirre LPN:    PT/INR    The following tubes were sent:     0 SST, 0Lav, 1Blue top, 0urine    Left AC and Left hand cleansed using aseptic technique. Specimen obtained using a 21 gauge butterfly  with 2 attempt. Patient tolerated process well and there was no bleeding noted at site.

## 2019-03-31 RX ORDER — WARFARIN 7.5 MG/1
TABLET ORAL
Qty: 30 TAB | Refills: 0 | Status: SHIPPED | OUTPATIENT
Start: 2019-03-31 | End: 2019-09-18

## 2019-03-31 RX ORDER — WARFARIN 10 MG/1
10 TABLET ORAL DAILY
Qty: 60 TAB | Refills: 0 | Status: SHIPPED | OUTPATIENT
Start: 2019-03-31 | End: 2019-07-23 | Stop reason: SDUPTHER

## 2019-04-01 ENCOUNTER — TELEPHONE (OUTPATIENT)
Dept: FAMILY MEDICINE CLINIC | Age: 64
End: 2019-04-01

## 2019-04-01 RX ORDER — WARFARIN 7.5 MG/1
7.5 TABLET ORAL DAILY
Qty: 30 TAB | Refills: 0 | Status: SHIPPED | OUTPATIENT
Start: 2019-04-01 | End: 2019-09-18

## 2019-04-01 NOTE — TELEPHONE ENCOUNTER
Called patient and spoke to him in regards to his level and I have sent over a script for 7.5 mg which has been decreased

## 2019-07-19 ENCOUNTER — HOSPITAL ENCOUNTER (OUTPATIENT)
Dept: LAB | Age: 64
Discharge: HOME | End: 2019-07-19
Payer: MEDICARE

## 2019-07-19 ENCOUNTER — LAB ONLY (OUTPATIENT)
Dept: FAMILY MEDICINE CLINIC | Age: 64
End: 2019-07-19

## 2019-07-19 DIAGNOSIS — I48.0 PAROXYSMAL ATRIAL FIBRILLATION (HCC): ICD-10-CM

## 2019-07-19 DIAGNOSIS — Z51.81 ANTICOAGULATION GOAL OF INR 2 TO 3: ICD-10-CM

## 2019-07-19 DIAGNOSIS — Z79.01 ANTICOAGULANT LONG-TERM USE: ICD-10-CM

## 2019-07-19 DIAGNOSIS — Z79.01 ANTICOAGULATION GOAL OF INR 2 TO 3: ICD-10-CM

## 2019-07-19 DIAGNOSIS — Z79.01 ANTICOAGULATION GOAL OF INR 2 TO 3: Primary | ICD-10-CM

## 2019-07-19 DIAGNOSIS — Z51.81 ANTICOAGULATION GOAL OF INR 2 TO 3: Primary | ICD-10-CM

## 2019-07-19 LAB
INR PPP: 1.8 (ref 0.8–1.2)
PROTHROMBIN TIME: 21.2 SEC (ref 11.5–15.2)

## 2019-07-19 PROCEDURE — 85610 PROTHROMBIN TIME: CPT

## 2019-07-19 NOTE — PROGRESS NOTES
Prior to obtaining blood specimen for PT/INR, made provider aware that patient has not been seen since Dec 2018 and that he has not had a PT/INR since March 2019. Provider stated to draw blood and that she will contact patient.

## 2019-07-19 NOTE — PROGRESS NOTES
Patient presents for lab draw ordered by:    Ordering Provider:  Gavin Khan Department/Practice:  [facility]  Phone:  [phone]  Date Ordered:  7/19/19    The following labs were drawn and sent to [facility]  by Tano Fontanez LPN:    PT/INR    The following tubes were sent:     0 SST, 0Lav, 1Blue top, 0urine    Left hand cleansed using aseptic technique. Specimen obtained using a 23 gauge butterfly  with 1 attempt. Patient tolerated process well and there was no bleeding noted at site.

## 2019-07-23 ENCOUNTER — TELEPHONE (OUTPATIENT)
Dept: FAMILY MEDICINE CLINIC | Age: 64
End: 2019-07-23

## 2019-07-23 RX ORDER — WARFARIN 10 MG/1
10 TABLET ORAL DAILY
Qty: 60 TAB | Refills: 0 | Status: SHIPPED | OUTPATIENT
Start: 2019-07-23 | End: 2019-09-18 | Stop reason: SDUPTHER

## 2019-07-23 NOTE — TELEPHONE ENCOUNTER
Called patient and discussed his PT/INR. I have explained to him in detail that I will refill his last coumadin script and now he must follow up with coumadin clinic and be regular in monitoring , he is aware and information will be mailed out in regards to this.

## 2019-09-18 ENCOUNTER — OFFICE VISIT (OUTPATIENT)
Dept: FAMILY MEDICINE CLINIC | Age: 64
End: 2019-09-18

## 2019-09-18 VITALS
RESPIRATION RATE: 18 BRPM | HEIGHT: 75 IN | HEART RATE: 103 BPM | WEIGHT: 315 LBS | DIASTOLIC BLOOD PRESSURE: 83 MMHG | TEMPERATURE: 97.6 F | SYSTOLIC BLOOD PRESSURE: 146 MMHG | BODY MASS INDEX: 39.17 KG/M2 | OXYGEN SATURATION: 100 %

## 2019-09-18 DIAGNOSIS — R35.0 URINARY FREQUENCY: ICD-10-CM

## 2019-09-18 DIAGNOSIS — I48.0 PAROXYSMAL ATRIAL FIBRILLATION (HCC): Chronic | ICD-10-CM

## 2019-09-18 DIAGNOSIS — I82.5Y3 CHRONIC DEEP VEIN THROMBOSIS (DVT) OF PROXIMAL VEIN OF BOTH LOWER EXTREMITIES (HCC): ICD-10-CM

## 2019-09-18 DIAGNOSIS — Z79.01 ANTICOAGULANT LONG-TERM USE: ICD-10-CM

## 2019-09-18 DIAGNOSIS — Z79.01 ANTICOAGULATION GOAL OF INR 2 TO 3: Primary | ICD-10-CM

## 2019-09-18 DIAGNOSIS — Z12.5 SCREENING PSA (PROSTATE SPECIFIC ANTIGEN): ICD-10-CM

## 2019-09-18 DIAGNOSIS — Z12.11 SCREENING FOR COLORECTAL CANCER: ICD-10-CM

## 2019-09-18 DIAGNOSIS — R35.1 NOCTURIA: ICD-10-CM

## 2019-09-18 DIAGNOSIS — Z00.00 MEDICARE ANNUAL WELLNESS VISIT, SUBSEQUENT: ICD-10-CM

## 2019-09-18 DIAGNOSIS — D68.9 ACQUIRED COAGULATION DISORDER (HCC): ICD-10-CM

## 2019-09-18 DIAGNOSIS — Z12.12 SCREENING FOR COLORECTAL CANCER: ICD-10-CM

## 2019-09-18 DIAGNOSIS — E66.01 OBESITY, MORBID (HCC): ICD-10-CM

## 2019-09-18 DIAGNOSIS — F51.01 PRIMARY INSOMNIA: ICD-10-CM

## 2019-09-18 DIAGNOSIS — Z76.0 MEDICATION REFILL: ICD-10-CM

## 2019-09-18 DIAGNOSIS — R06.83 SNORING: ICD-10-CM

## 2019-09-18 DIAGNOSIS — Z51.81 ANTICOAGULATION GOAL OF INR 2 TO 3: Primary | ICD-10-CM

## 2019-09-18 PROBLEM — M46.26 OSTEOMYELITIS OF LUMBAR SPINE (HCC): Status: RESOLVED | Noted: 2017-11-27 | Resolved: 2019-09-18

## 2019-09-18 RX ORDER — WARFARIN 10 MG/1
10 TABLET ORAL DAILY
Qty: 60 TAB | Refills: 0 | Status: SHIPPED | OUTPATIENT
Start: 2019-09-18 | End: 2019-11-26 | Stop reason: SDUPTHER

## 2019-09-18 NOTE — PROGRESS NOTES
Patient is here for difficulty sleeping at night, patient needs a refill of warfarin. 1. Have you been to the ER, urgent care clinic since your last visit? Hospitalized since your last visit?no    2. Have you seen or consulted any other health care providers outside of the [address] [place] [address] since your last visit? Include any pap smears or colon screening.  no

## 2019-09-18 NOTE — PROGRESS NOTES
HISTORY OF PRESENT ILLNESS  Cecilia Acosta is a 62 y.o. male. Patient is here for his anticoagulation monitoring follow up. He is aware with him being on coumadin he has to be monitered more regularly. He is due to have his PT/INR checked. I will order blood work today. He also mentions he knows all the signs for when his INR can be high and out of range and will inform the clinic. He is also due to have his medicare wellness visit subsequent. I have discussed vaccinations / FOBT testing. He also mentions he is not able to sleep well. He mentions in the last few weeks he sleeps in the am at 7 or 8. I have discussed a sleep study. He has also gained about 50 lbs since last visit and I have advised weight loss. He does have frequent urination / dribbling of his urine stream. He has not had his psa checked out. I have discussed referral to urology. He mentions he is not able to have much done due to his current financial situation. Anticoagulation   The history is provided by the patient. This is a chronic problem. The problem occurs constantly. The problem has not changed since onset. Associated symptoms include shortness of breath. Pertinent negatives include no chest pain, no abdominal pain and no headaches. The symptoms are aggravated by stress. The symptoms are relieved by medications. Treatments tried: coumadin. The treatment provided mild relief. Annual Exam   The history is provided by the patient. This is a new problem. The problem occurs constantly. The problem has not changed since onset. Associated symptoms include shortness of breath. Pertinent negatives include no chest pain, no abdominal pain and no headaches. Review of Systems   Constitutional: Negative for chills, fever and malaise/fatigue. HENT: Negative for congestion, ear pain, hearing loss, nosebleeds, sinus pain and sore throat. Eyes: Negative for blurred vision, double vision, pain and discharge.    Respiratory: Positive for cough and shortness of breath. Negative for sputum production and wheezing. Cardiovascular: Negative for chest pain, palpitations and leg swelling. Gastrointestinal: Negative for abdominal pain, constipation and diarrhea. Genitourinary: Positive for frequency and urgency. Negative for dysuria, flank pain and hematuria. Musculoskeletal: Positive for back pain, joint pain and neck pain. Skin: Positive for itching and rash. Bilateral legs swelling and edema   Neurological: Negative for dizziness, focal weakness, weakness and headaches. Endo/Heme/Allergies: Negative for environmental allergies. Psychiatric/Behavioral: Negative for depression, hallucinations, substance abuse and suicidal ideas. The patient is nervous/anxious and has insomnia. Visit Vitals  /83   Pulse (!) 103   Temp 97.6 °F (36.4 °C) (Oral)   Resp 18   Ht 6' 3\" (1.905 m)   Wt (!) 391 lb (177.4 kg)   SpO2 100%   BMI 48.87 kg/m²       Physical Exam   Constitutional: He is oriented to person, place, and time. He appears well-developed and well-nourished. No distress. HENT:   Head: Normocephalic and atraumatic. Right Ear: External ear normal.   Left Ear: External ear normal.   Mouth/Throat: Oropharynx is clear and moist. No oropharyngeal exudate. Eyes: Pupils are equal, round, and reactive to light. EOM are normal. No scleral icterus. Neck: Normal range of motion. No thyromegaly present. Cardiovascular: Normal rate, regular rhythm and normal heart sounds. Pulmonary/Chest: Effort normal and breath sounds normal. No respiratory distress. He has no wheezes. Abdominal: Soft. Bowel sounds are normal. He exhibits no distension. There is no tenderness. Musculoskeletal: He exhibits edema. Right lower leg: He exhibits swelling. Left lower leg: He exhibits swelling. Legs:  Lymphadenopathy:     He has no cervical adenopathy. Neurological: He is alert and oriented to person, place, and time. Psychiatric: He has a normal mood and affect. This is the Subsequent Medicare Annual Wellness Exam, performed 12 months or more after the Initial AWV or the last Subsequent AWV    I have reviewed the patient's medical history in detail and updated the computerized patient record. History     Past Medical History:   Diagnosis Date    Arthritis     Chronic pain     Hip post hip replacement    Lumbar discitis     Osteomyelitis of lumbar spine (HCC)     Thromboembolus ([address]     Transfusion history       Past Surgical History:   Procedure Laterality Date    HX ORTHOPAEDIC      TOTAL HIP ARTHROPLASTY      VASCULAR SURGERY PROCEDURE UNLIST  2012    x3     Current Outpatient Medications   Medication Sig Dispense Refill    warfarin (COUMADIN) 10 mg tablet Take 1 Tab by mouth daily. 60 Tab 0    warfarin (COUMADIN) 7.5 mg tablet Take 1 Tab by mouth daily. 30 Tab 0    warfarin (COUMADIN) 7.5 mg tablet Take one tablet by mouth daily.   Indications: Deep Vein Thrombosis Prevention 30 Tab 0     Allergies   Allergen Reactions    Ciprofloxacin Anaphylaxis and Diarrhea    Morphine Itching     Does not alleviate pain    Onion Other (comments)     Elevated HTN and Body temp and cramping     Family History   Problem Relation Age of Onset    Bleeding Prob Mother     Parkinson's Disease Father      Social History     Tobacco Use    Smoking status: Current Every Day Smoker     Packs/day: 0.50    Smokeless tobacco: Never Used   Substance Use Topics    Alcohol use: No     Patient Active Problem List   Diagnosis Code    Obesity, morbid ([facility]) E66.01    Lumbar discitis M46.46    Chronic deep vein thrombosis (DVT) of proximal vein of both lower extremities ([facility]) I82.5Y3       Depression Risk Factor Screening:     3 most recent PHQ Screens 5/8/2018   Little interest or pleasure in doing things Not at all   Feeling down, depressed, irritable, or hopeless Not at all   Total Score PHQ 2 0     Alcohol Risk Factor Screening:   Patient does not drink     Functional Ability and Level of Safety:   Hearing Loss  Good hearing on whisper testing , 2 feel behind the patient. Activities of Daily Living  The home contains: no safety equipment. Patient does total self care    Fall Risk  No flowsheet data found. Abuse Screen  Patient is not abused    Cognitive Screening   Evaluation of Cognitive Function:  Has your family/caregiver stated any concerns about your memory: no  Normal    Patient Care Team   Patient Care Team:  Rayne Kent MD as PCP - General (Family Practice)  Other, MD Jesus    Assessment/Plan   Education and counseling provided:  Are appropriate based on today's review and evaluation  End-of-Life planning (with patient's consent)  Pneumococcal Vaccine  Influenza Vaccine  Hepatitis B Vaccine  Prostate cancer screening tests (PSA, covered annually)  Colorectal cancer screening tests  Cardiovascular screening blood test  Bone mass measurement (DEXA)  Screening for glaucoma  Diabetes screening test    Diagnoses and all orders for this visit:    1. Anticoagulation goal of INR 2 to 3    2. Paroxysmal atrial fibrillation (HCC)    3. Anticoagulant long-term use    4. Chronic deep vein thrombosis (DVT) of proximal vein of both lower extremities (HCC)    5. Obesity, morbid ([address]    6. Acquired coagulation disorder ([address]    7. Medicare annual wellness visit, subsequent        Health Maintenance Due   Topic Date Due    FOBT Q 1 YEAR AGE 50-75  02/28/2006    MEDICARE YEARLY EXAM  05/09/2019     Subsequent medicare wellness visit ;  1) Please make sure you have a routine physical exam every 1-2 years. 2) Annual check up with eye doctor and dentist.  3) Colorectal cancer screening with colonoscopy every ten years at age 48. Depending on certain risk factors screening may need to be done earlier. 4) Rectal exam and PSA screening at age of 48, screening may be done earlier depending on certain risk factors as discussed. ) Bone density testing starting at the age of 72.   ) Routine blood work to be ordered as part of physical exam and has been discussed with patient.  ) Screening for STD's/HIV.  ) Exercise at least 30 min 3-5 times a week for goal BMI of less than or equal to 25. Please make sure you wear a seat belt while driving daily , helmet safety discussed.  ) Please avoid smoking , alcohol and illicit drug use.  ) Daily requirement of calcium is 1200 mg per day and 1000 IU of vitamin D.  ) Please make sure all immunizations are up to date:       - Influenza vaccine every year        - Tdap every 10 years       - Pneumococcal vaccine starting at age of 72       - Shingles at age 61       FOBT testing kit has been given  Discussed vaccines    Chronic anticoagulation :  - due to have PT /INR  - refill on coumadin   - explained importance of anticoagulation monitoring at least every 2 weeks to one month. Insomnia :   Ok to take medication prescribed for sleep , please do not take more than reccommended dose. 2) Explained the side effects of medication , pt verbalizes understanding. 2) Sleep hygiene discussed with patient. 3) Consider sleep study if problem gets worse. Referral has been made    Urinary frequency / nocturia:  - referral to urology     Obesity :  - Lifestyle modification is necessary to ensure weight loss and this includes diet , exercise and behavioral modification.  - Diet is geared towards balanced low-calorie diets/portion-controlled diets, low fat diet , low carbohydrate diet and mediterranean diet. Start with 1500 kcal diet.   -Exercise 30 minutes 3-5 times a week   - Behavioral modification helps in controlling eating behavior

## 2019-09-18 NOTE — PATIENT INSTRUCTIONS
Medicare Wellness Visit, Male The best way to live healthy is to have a lifestyle where you eat a well-balanced diet, exercise regularly, limit alcohol use, and quit all forms of tobacco/nicotine, if applicable. Regular preventive services are another way to keep healthy. Preventive services (vaccines, screening tests, monitoring & exams) can help personalize your care plan, which helps you manage your own care. Screening tests can find health problems at the earliest stages, when they are easiest to treat. [facility] follows the current, evidence-based guidelines published by the [facility] ([facility]) when recommending preventive services for our patients. Because we follow these guidelines, sometimes recommendations change over time as research supports it. (For example, a prostate screening blood test is no longer routinely recommended for men with no symptoms.) Of course, you and your doctor may decide to screen more often for some diseases, based on your risk and co-morbidities (chronic disease you are already diagnosed with). Preventive services for you include: - Medicare offers their members a free annual wellness visit, which is time for you and your primary care provider to discuss and plan for your preventive service needs. Take advantage of this benefit every year! 
-All adults over age 72 should receive the recommended pneumonia vaccines. Current USPSTF guidelines recommend a series of two vaccines for the best pneumonia protection.  
-All adults should have a flu vaccine yearly and an ECG.  All adults age 61 and older should receive a shingles vaccine once in their lifetime.   
-All adults age 38-68 who are overweight should have a diabetes screening test once every three years.  
-Other screening tests & preventive services for persons with diabetes include: an eye exam to screen for diabetic retinopathy, a kidney function test, a foot exam, and stricter control over your cholesterol.  
-Cardiovascular screening for adults with routine risk involves an electrocardiogram (ECG) at intervals determined by the provider.  
-Colorectal cancer screening should be done for adults age 54-65 with no increased risk factors for colorectal cancer. There are a number of acceptable methods of screening for this type of cancer. Each test has its own benefits and drawbacks. Discuss with your provider what is most appropriate for you during your annual wellness visit. The different tests include: colonoscopy (considered the best screening method), a fecal occult blood test, a fecal DNA test, and sigmoidoscopy. 
-All adults born between [facility] should be screened once for Hepatitis C. 
-An Abdominal Aortic Aneurysm (AAA) Screening is recommended for men age 73-68 who has ever smoked in their lifetime. Here is a list of your current Health Maintenance items (your personalized list of preventive services) with a due date: 
Health Maintenance Due Topic Date Due  Stool testing for trace blood  02/28/2006 [facility] Annual Well Visit  05/09/2019

## 2019-11-22 LAB
ALBUMIN SERPL-MCNC: 4.1 G/DL (ref 3.6–4.8)
ALBUMIN/GLOB SERPL: 1.3 {RATIO} (ref 1.2–2.2)
ALP SERPL-CCNC: 96 IU/L (ref 39–117)
ALT SERPL-CCNC: 9 IU/L (ref 0–44)
AST SERPL-CCNC: 17 IU/L (ref 0–40)
BASOPHILS # BLD AUTO: 0.1 X10E3/UL (ref 0–0.2)
BASOPHILS NFR BLD AUTO: 1 %
BILIRUB SERPL-MCNC: 0.3 MG/DL (ref 0–1.2)
BUN SERPL-MCNC: 13 MG/DL (ref 8–27)
BUN/CREAT SERPL: 14 (ref 10–24)
CALCIUM SERPL-MCNC: 9.5 MG/DL (ref 8.6–10.2)
CHLORIDE SERPL-SCNC: 97 MMOL/L (ref 96–106)
CHOLEST SERPL-MCNC: 194 MG/DL (ref 100–199)
CO2 SERPL-SCNC: 18 MMOL/L (ref 20–29)
CREAT SERPL-MCNC: 0.92 MG/DL (ref 0.76–1.27)
EOSINOPHIL # BLD AUTO: 0.2 X10E3/UL (ref 0–0.4)
EOSINOPHIL NFR BLD AUTO: 2 %
ERYTHROCYTE [DISTWIDTH] IN BLOOD BY AUTOMATED COUNT: 13.7 % (ref 12.3–15.4)
GLOBULIN SER CALC-MCNC: 3.1 G/DL (ref 1.5–4.5)
GLUCOSE SERPL-MCNC: 108 MG/DL (ref 65–99)
HCT VFR BLD AUTO: 45.3 % (ref 37.5–51)
HDLC SERPL-MCNC: 36 MG/DL
HGB BLD-MCNC: 15 G/DL (ref 13–17.7)
IMM GRANULOCYTES # BLD AUTO: 0.1 X10E3/UL (ref 0–0.1)
IMM GRANULOCYTES NFR BLD AUTO: 1 %
INR PPP: 2.1 (ref 0.8–1.2)
LDLC SERPL CALC-MCNC: 105 MG/DL (ref 0–99)
LYMPHOCYTES # BLD AUTO: 2.2 X10E3/UL (ref 0.7–3.1)
LYMPHOCYTES NFR BLD AUTO: 29 %
MCH RBC QN AUTO: 27 PG (ref 26.6–33)
MCHC RBC AUTO-ENTMCNC: 33.1 G/DL (ref 31.5–35.7)
MCV RBC AUTO: 82 FL (ref 79–97)
MONOCYTES # BLD AUTO: 0.6 X10E3/UL (ref 0.1–0.9)
MONOCYTES NFR BLD AUTO: 8 %
NEUTROPHILS # BLD AUTO: 4.4 X10E3/UL (ref 1.4–7)
NEUTROPHILS NFR BLD AUTO: 59 %
PLATELET # BLD AUTO: 292 X10E3/UL (ref 150–450)
POTASSIUM SERPL-SCNC: 4.5 MMOL/L (ref 3.5–5.2)
PROT SERPL-MCNC: 7.2 G/DL (ref 6–8.5)
PROTHROMBIN TIME: 20.9 SEC (ref 9.1–12)
RBC # BLD AUTO: 5.55 X10E6/UL (ref 4.14–5.8)
SODIUM SERPL-SCNC: 136 MMOL/L (ref 134–144)
TRIGL SERPL-MCNC: 263 MG/DL (ref 0–149)
VLDLC SERPL CALC-MCNC: 53 MG/DL (ref 5–40)
WBC # BLD AUTO: 7.5 X10E3/UL (ref 3.4–10.8)

## 2019-11-26 ENCOUNTER — TELEPHONE (OUTPATIENT)
Dept: FAMILY MEDICINE CLINIC | Age: 64
End: 2019-11-26

## 2019-11-26 RX ORDER — WARFARIN 10 MG/1
10 TABLET ORAL DAILY
Qty: 60 TAB | Refills: 0 | Status: SHIPPED | OUTPATIENT
Start: 2019-11-26 | End: 2020-01-07

## 2019-12-29 PROBLEM — I62.01 ACUTE ON CHRONIC INTRACRANIAL SUBDURAL HEMATOMA (HCC): Status: ACTIVE | Noted: 2019-12-29

## 2019-12-29 PROBLEM — G93.40 ACUTE ENCEPHALOPATHY: Status: ACTIVE | Noted: 2019-12-29

## 2019-12-29 PROBLEM — I62.03 ACUTE ON CHRONIC INTRACRANIAL SUBDURAL HEMATOMA (HCC): Status: ACTIVE | Noted: 2019-12-29

## 2019-12-29 PROBLEM — D68.9 COAGULOPATHY (HCC): Status: ACTIVE | Noted: 2019-12-29

## 2020-01-08 ENCOUNTER — PATIENT OUTREACH (OUTPATIENT)
Dept: FAMILY MEDICINE CLINIC | Age: 65
End: 2020-01-08

## 2020-01-08 NOTE — PROGRESS NOTES
Hospital Discharge Follow-Up      Date/Time:  2020 2:11 PM    Patient was admitted to [facility] on 19 and discharged on 20 for coagulopathy, encephalopathy sub dural hematoma. The physician discharge summary was available at the time of outreach. Patient was contacted within 1 business days of discharge. Top Challenges reviewed with the provider   Patient reports :   Feeling much better  [facility] has started   Will be finding a new PCP with in the [facility]  Patient Denies :   Fever   Pain   drainage from incision sites    Advance Care Planning:   Does patient have an Advance Directive:  not on file          Was this a readmission? no   Patient stated reason for the readmission: n/a    Care Transition Nurse (CTN) contacted the patient by telephone to perform post hospital discharge assessment. Verified name and  with patient as identifiers. Provided introduction to self, and explanation of the CTN role. Patient received hospital discharge instructions. CTN reviewed discharge instructions and red flags with patient who verbalized understanding. Patient given an opportunity to ask questions and does not have any further questions or concerns at this time. The patient agrees to contact the PCP office for questions related to their healthcare. CTN provided contact information for future reference.     Patients top risk factors for readmission:  depression, ineffective coping, lack of knowledge about disease, level of motivation, medical condition, medication management, PCP relationship    Home Health orders at discharge: PT, OT, [facility]: [facility]  Date of initial visit: 2020     Durable Medical Equipment ordered at discharge: none    Medication(s):   New Medications at Discharge:  acetaminophen 325 mg tablet  Commonly known as:  TYLENOL     Your last dose was:       Your next dose is:            Take 2 Tabs by mouth every six (6) hours as needed for Fever (For fever or pain. ).    650 mg      levETIRAcetam 1,000 mg tablet     Your last dose was:       Your next dose is:            Take 1 Tab by mouth two (2) times a day.    1,000 mg      losartan 50 mg tablet  Commonly known as:  COZAAR  Start taking on:  January 8, 2020     Your last dose was:       Your next dose is:            Take 1 Tab by mouth daily.    50 mg      metoprolol tartrate 25 mg tablet  Commonly known as:  LOPRESSOR     Your last dose was:       Your next dose is:            Take 1 Tab by mouth two (2) times a day.    25 mg      oxyCODONE-acetaminophen 5-325 mg per tablet  Commonly known as:  PERCOCET     Your last dose was:       Your next dose is:            Take 1 Tab by mouth every four (4) hours as needed for Pain for up to 3 days. Max Daily Amount: 6 Tabs.    1 Tab      predniSONE 5 mg tablet  Commonly known as:  [place]  Start taking on:  January 8, 2020     Your last dose was:       Your next dose is:            Take 5 Tabs by mouth once for 1 dose. 25 mg day one, decreased by 5 mg every day.    25 mg      senna 8.6 mg tablet  Commonly known as:  SENOKOT     Your last dose was:       Your next dose is:            Take 1 Tab by mouth three (3) times daily.    1 Tab      tamsulosin 0.4 mg capsule  Commonly known as:  FLOMAX  Start taking on:  January 8, 2020     Your last dose was:       Your next dose is:            Take 1 Cap by mouth daily.    0.4 mg          Changed Medications at Discharge: none  Discontinued Medications at Discharge: coumadin 10 mg    Medication reconciliation was performed with patient, who verbalizes understanding of administration of home medications. There were no barriers to obtaining medications identified at this time. Referral to Pharm D needed: no     Current Outpatient Medications   Medication Sig    acetaminophen (TYLENOL) 325 mg tablet Take 2 Tabs by mouth every six (6) hours as needed for Fever (For fever or pain. ).     levETIRAcetam 1,000 mg tablet Take 1 Tab by mouth two (2) times a day.  losartan (COZAAR) 50 mg tablet Take 1 Tab by mouth daily.  metoprolol tartrate (LOPRESSOR) 25 mg tablet Take 1 Tab by mouth two (2) times a day.  oxyCODONE-acetaminophen (PERCOCET) 5-325 mg per tablet Take 1 Tab by mouth every four (4) hours as needed for Pain for up to 3 days. Max Daily Amount: 6 Tabs.  predniSONE (DELTASONE) 5 mg tablet Take 5 Tabs by mouth once for 1 dose. 25 mg day one, decreased by 5 mg every day.  senna (SENOKOT) 8.6 mg tablet Take 1 Tab by mouth three (3) times daily.  tamsulosin (FLOMAX) 0.4 mg capsule Take 1 Cap by mouth daily. No current facility-administered medications for this visit. There are no discontinued medications. [facility] follow up appointment(s):   Future Appointments   Date Time Provider Zarina Soto   1/30/2020  3:00 PM Dedra Abraham MD [facility]      Non-[facility] follow up appointment(s): TBD    Goals    None        Patient states he is actively working with [facility] staff to find a new primary care doctor. Episode will be closed related to patient has no affiliation with [employer] and does not wish to find a new PCP inside the organization due to location.